# Patient Record
Sex: FEMALE | Race: WHITE | NOT HISPANIC OR LATINO | Employment: FULL TIME | URBAN - NONMETROPOLITAN AREA
[De-identification: names, ages, dates, MRNs, and addresses within clinical notes are randomized per-mention and may not be internally consistent; named-entity substitution may affect disease eponyms.]

---

## 2020-04-06 ENCOUNTER — HISTORICAL (OUTPATIENT)
Dept: ADMINISTRATIVE | Facility: HOSPITAL | Age: 35
End: 2020-04-06

## 2022-06-09 ENCOUNTER — OFFICE VISIT (OUTPATIENT)
Dept: FAMILY MEDICINE | Facility: CLINIC | Age: 37
End: 2022-06-09
Payer: MEDICAID

## 2022-06-09 VITALS
HEART RATE: 84 BPM | TEMPERATURE: 98 F | SYSTOLIC BLOOD PRESSURE: 136 MMHG | BODY MASS INDEX: 31.45 KG/M2 | WEIGHT: 177.5 LBS | HEIGHT: 63 IN | DIASTOLIC BLOOD PRESSURE: 81 MMHG | RESPIRATION RATE: 16 BRPM | OXYGEN SATURATION: 99 %

## 2022-06-09 DIAGNOSIS — F33.1 MODERATE EPISODE OF RECURRENT MAJOR DEPRESSIVE DISORDER: ICD-10-CM

## 2022-06-09 DIAGNOSIS — Z76.89 ESTABLISHING CARE WITH NEW DOCTOR, ENCOUNTER FOR: Primary | ICD-10-CM

## 2022-06-09 PROCEDURE — 3079F PR MOST RECENT DIASTOLIC BLOOD PRESSURE 80-89 MM HG: ICD-10-PCS | Mod: CPTII,,, | Performed by: FAMILY MEDICINE

## 2022-06-09 PROCEDURE — 3075F SYST BP GE 130 - 139MM HG: CPT | Mod: CPTII,,, | Performed by: FAMILY MEDICINE

## 2022-06-09 PROCEDURE — 99203 PR OFFICE/OUTPT VISIT, NEW, LEVL III, 30-44 MIN: ICD-10-PCS | Mod: ,,, | Performed by: FAMILY MEDICINE

## 2022-06-09 PROCEDURE — 1159F MED LIST DOCD IN RCRD: CPT | Mod: CPTII,,, | Performed by: FAMILY MEDICINE

## 2022-06-09 PROCEDURE — 99203 OFFICE O/P NEW LOW 30 MIN: CPT | Mod: ,,, | Performed by: FAMILY MEDICINE

## 2022-06-09 PROCEDURE — 1159F PR MEDICATION LIST DOCUMENTED IN MEDICAL RECORD: ICD-10-PCS | Mod: CPTII,,, | Performed by: FAMILY MEDICINE

## 2022-06-09 PROCEDURE — 3008F PR BODY MASS INDEX (BMI) DOCUMENTED: ICD-10-PCS | Mod: CPTII,,, | Performed by: FAMILY MEDICINE

## 2022-06-09 PROCEDURE — 3008F BODY MASS INDEX DOCD: CPT | Mod: CPTII,,, | Performed by: FAMILY MEDICINE

## 2022-06-09 PROCEDURE — 1160F RVW MEDS BY RX/DR IN RCRD: CPT | Mod: CPTII,,, | Performed by: FAMILY MEDICINE

## 2022-06-09 PROCEDURE — 1160F PR REVIEW ALL MEDS BY PRESCRIBER/CLIN PHARMACIST DOCUMENTED: ICD-10-PCS | Mod: CPTII,,, | Performed by: FAMILY MEDICINE

## 2022-06-09 PROCEDURE — 3079F DIAST BP 80-89 MM HG: CPT | Mod: CPTII,,, | Performed by: FAMILY MEDICINE

## 2022-06-09 PROCEDURE — 3075F PR MOST RECENT SYSTOLIC BLOOD PRESS GE 130-139MM HG: ICD-10-PCS | Mod: CPTII,,, | Performed by: FAMILY MEDICINE

## 2022-06-09 NOTE — PROGRESS NOTES
Carmen Laurent  06/09/2022  [id number]    Mona De Jesus MD  Patient Care Team:  Mona Martínez MD as PCP - General (Family Medicine)      Chief Complaint:  Chief Complaint   Patient presents with    [facility]    Anemia       History of Present Illness:  HPI    36 y.o. female who presents today to [facility]. She c/o chronic, recurrent depression since childhood. She has tried and failed prozac and zoloft. States both of those medications made her feel terrible. She adamantly declines medication treatment at this time. She does feel like she wishes she was not alive at times, but denies a plan or any past attempts. Patient is dealing with many life stressors at this time and does agree to therapy. PHQ-9 score is 12.     Review of Systems  General: denies f/c, weight loss, night sweats, decreased appetite  Eye: denies blurred vision, changes in vision  Respiratory: denies sob, wheezing, cough  Cardiovascular: denies chest pain, palpitations, edema  Gastrointestinal: denies abdominal pain, n/v, constipation, diarrhea  Integumentary: denies rashes, pruritis        Health Maintenance  Health Maintenance Topics with due status: Not Due       Topic Last Completion Date    Influenza Vaccine Not Due     Health Maintenance Due   Topic Date Due    Hepatitis C Screening  Never done    Cervical Cancer Screening  Never done    Lipid Panel  Never done    COVID-19 Vaccine (1) Never done    Pneumococcal Vaccines (Age 0-64) (1 - PCV) Never done    HIV Screening  Never done    TETANUS VACCINE  09/06/2017       Exam:  Vitals:    06/09/22 1357   BP: 136/81   Pulse: 84   Resp: 16   Temp: 97.7 °F (36.5 °C)     Weight: 80.5 kg (177 lb 7.5 oz)   Body mass index is 31.84 kg/m².      Physical Exam  Constitutional: NAD, alert, pleasant  Respiratory: CTAB, no wheezes, rales or rhonchi. No accessory muscle use  Eyes: EOMI  Cardiovascular: RRR, No m/r/g. No JVD. No LE edema  Gastrointestinal: BS+, nontender,  nondistended  Integumentary: warm, dry, intact  Psych: AA&Ox3, tearful during most of the visit            1. Establishing care with new doctor, encounter for    2. Moderate episode of recurrent major depressive disorder  -     Ambulatory referral/consult to Psychology     patient adamantly declines medication. I did  her on starting one. She is not a threat to herself and agrees to therapy so this is a good place to start. She has a good relationship with her mom who seems very supportive.go to ER for worsening depression or SI/HI. Call 911 or go to ED if you have SI/HI.         Follow up: Follow up in about 1 week (around 6/16/2022) for wellness with papsmear.      Care Plan/Goals: Reviewed   Goals    None

## 2022-06-14 DIAGNOSIS — Z00.00 WELLNESS EXAMINATION: Primary | ICD-10-CM

## 2022-06-20 ENCOUNTER — LAB VISIT (OUTPATIENT)
Dept: LAB | Facility: HOSPITAL | Age: 37
End: 2022-06-20
Attending: FAMILY MEDICINE
Payer: MEDICAID

## 2022-06-20 DIAGNOSIS — Z00.00 WELLNESS EXAMINATION: ICD-10-CM

## 2022-06-20 LAB
ANION GAP SERPL CALC-SCNC: 8 MEQ/L
BASOPHILS # BLD AUTO: 0.02 X10(3)/MCL (ref 0–0.2)
BASOPHILS NFR BLD AUTO: 0.3 %
BUN SERPL-MCNC: 15 MG/DL (ref 7–18.7)
CALCIUM SERPL-MCNC: 9 MG/DL (ref 8.4–10.2)
CHLORIDE SERPL-SCNC: 103 MMOL/L (ref 98–107)
CHOLEST SERPL-MCNC: 159 MG/DL
CHOLEST/HDLC SERPL: 3 {RATIO} (ref 0–5)
CO2 SERPL-SCNC: 29 MMOL/L (ref 22–29)
CREAT SERPL-MCNC: 0.91 MG/DL (ref 0.55–1.02)
CREAT/UREA NIT SERPL: 16
EOSINOPHIL # BLD AUTO: 0.09 X10(3)/MCL (ref 0–0.9)
EOSINOPHIL NFR BLD AUTO: 1.3 %
ERYTHROCYTE [DISTWIDTH] IN BLOOD BY AUTOMATED COUNT: 13 % (ref 11.5–17)
GLUCOSE SERPL-MCNC: 87 MG/DL (ref 74–100)
HCT VFR BLD AUTO: 37.2 % (ref 37–47)
HDLC SERPL-MCNC: 63 MG/DL (ref 35–60)
HGB BLD-MCNC: 12.1 GM/DL (ref 12–16)
IMM GRANULOCYTES # BLD AUTO: 0.02 X10(3)/MCL (ref 0–0.02)
IMM GRANULOCYTES NFR BLD AUTO: 0.3 % (ref 0–0.43)
LDLC SERPL CALC-MCNC: 88 MG/DL (ref 50–140)
LYMPHOCYTES # BLD AUTO: 1.7 X10(3)/MCL (ref 0.6–4.6)
LYMPHOCYTES NFR BLD AUTO: 25.2 %
MCH RBC QN AUTO: 29.2 PG (ref 27–31)
MCHC RBC AUTO-ENTMCNC: 32.5 MG/DL (ref 33–36)
MCV RBC AUTO: 89.6 FL (ref 80–94)
MONOCYTES # BLD AUTO: 0.28 X10(3)/MCL (ref 0.1–1.3)
MONOCYTES NFR BLD AUTO: 4.2 %
NEUTROPHILS # BLD AUTO: 4.6 X10(3)/MCL (ref 2.1–9.2)
NEUTROPHILS NFR BLD AUTO: 68.7 %
NRBC BLD AUTO-RTO: 0 %
PLATELET # BLD AUTO: 410 X10(3)/MCL (ref 130–400)
PMV BLD AUTO: 9.3 FL (ref 9.4–12.4)
POTASSIUM SERPL-SCNC: 3.8 MMOL/L (ref 3.5–5.1)
RBC # BLD AUTO: 4.15 X10(6)/MCL (ref 4.2–5.4)
SODIUM SERPL-SCNC: 140 MMOL/L (ref 136–145)
TRIGL SERPL-MCNC: 40 MG/DL (ref 37–140)
VLDLC SERPL CALC-MCNC: 8 MG/DL
WBC # SPEC AUTO: 6.7 X10(3)/MCL (ref 4.5–11.5)

## 2022-06-20 PROCEDURE — 36415 COLL VENOUS BLD VENIPUNCTURE: CPT

## 2022-06-20 PROCEDURE — 80048 BASIC METABOLIC PNL TOTAL CA: CPT

## 2022-06-20 PROCEDURE — 83718 ASSAY OF LIPOPROTEIN: CPT

## 2022-06-20 PROCEDURE — 85025 COMPLETE CBC W/AUTO DIFF WBC: CPT

## 2022-06-21 ENCOUNTER — OFFICE VISIT (OUTPATIENT)
Dept: FAMILY MEDICINE | Facility: CLINIC | Age: 37
End: 2022-06-21
Payer: MEDICAID

## 2022-06-21 ENCOUNTER — TELEPHONE (OUTPATIENT)
Dept: FAMILY MEDICINE | Facility: CLINIC | Age: 37
End: 2022-06-21

## 2022-06-21 VITALS
RESPIRATION RATE: 16 BRPM | SYSTOLIC BLOOD PRESSURE: 138 MMHG | BODY MASS INDEX: 31.48 KG/M2 | WEIGHT: 177.69 LBS | TEMPERATURE: 98 F | OXYGEN SATURATION: 99 % | HEIGHT: 63 IN | DIASTOLIC BLOOD PRESSURE: 88 MMHG | HEART RATE: 92 BPM

## 2022-06-21 DIAGNOSIS — Z00.00 WELLNESS EXAMINATION: Primary | ICD-10-CM

## 2022-06-21 DIAGNOSIS — L02.92 BOIL: ICD-10-CM

## 2022-06-21 DIAGNOSIS — F17.210 CIGARETTE NICOTINE DEPENDENCE WITHOUT COMPLICATION: ICD-10-CM

## 2022-06-21 DIAGNOSIS — Z12.4 CERVICAL CANCER SCREENING: ICD-10-CM

## 2022-06-21 PROCEDURE — 99395 PR PREVENTIVE VISIT,EST,18-39: ICD-10-PCS | Mod: ,,, | Performed by: FAMILY MEDICINE

## 2022-06-21 PROCEDURE — 3008F BODY MASS INDEX DOCD: CPT | Mod: CPTII,,, | Performed by: FAMILY MEDICINE

## 2022-06-21 PROCEDURE — 3075F SYST BP GE 130 - 139MM HG: CPT | Mod: CPTII,,, | Performed by: FAMILY MEDICINE

## 2022-06-21 PROCEDURE — 3079F DIAST BP 80-89 MM HG: CPT | Mod: CPTII,,, | Performed by: FAMILY MEDICINE

## 2022-06-21 PROCEDURE — 87624 HPV HI-RISK TYP POOLED RSLT: CPT | Performed by: FAMILY MEDICINE

## 2022-06-21 PROCEDURE — 99395 PREV VISIT EST AGE 18-39: CPT | Mod: ,,, | Performed by: FAMILY MEDICINE

## 2022-06-21 PROCEDURE — 3008F PR BODY MASS INDEX (BMI) DOCUMENTED: ICD-10-PCS | Mod: CPTII,,, | Performed by: FAMILY MEDICINE

## 2022-06-21 PROCEDURE — 87591 N.GONORRHOEAE DNA AMP PROB: CPT | Performed by: FAMILY MEDICINE

## 2022-06-21 PROCEDURE — 87491 CHLMYD TRACH DNA AMP PROBE: CPT | Performed by: FAMILY MEDICINE

## 2022-06-21 PROCEDURE — 3079F PR MOST RECENT DIASTOLIC BLOOD PRESSURE 80-89 MM HG: ICD-10-PCS | Mod: CPTII,,, | Performed by: FAMILY MEDICINE

## 2022-06-21 PROCEDURE — 3075F PR MOST RECENT SYSTOLIC BLOOD PRESS GE 130-139MM HG: ICD-10-PCS | Mod: CPTII,,, | Performed by: FAMILY MEDICINE

## 2022-06-21 PROCEDURE — 87661 TRICHOMONAS VAGINALIS AMPLIF: CPT | Performed by: FAMILY MEDICINE

## 2022-06-21 RX ORDER — MUPIROCIN 20 MG/G
OINTMENT TOPICAL 3 TIMES DAILY
Qty: 22 G | Refills: 0 | Status: SHIPPED | OUTPATIENT
Start: 2022-06-21 | End: 2022-07-01

## 2022-06-21 NOTE — PROGRESS NOTES
Carmen Laurent  06/21/2022  [id number]    Mona De Jesus MD  Patient Care Team:  Mona Martínez MD as PCP - General (Family Medicine)      Chief Complaint:  Chief Complaint   Patient presents with    Annual Exam    Gynecologic Exam       History of Present Illness:  HPI    36 y.o. female who presents today for a wellness exam with a papsmear. Labs reviewed by me and were discussed with patient. All questions regarding lab results were answered.   She is a vaper and also smokes marijuana. Smoking cessation was offered and patient declined. She denies any significant FH. Does not exercise and eats a regular diet.     As a separate e/m visit, she c/o lesion to left side of abdomen that appeared as a boil last week and has since drained. It is improving and no longer painful. Patient has never had a boil before. She has been applying neosporin.     Review of Systems  General: denies f/c, weight loss, night sweats, decreased appetite  Eye: denies blurred vision, changes in vision  Respiratory: denies sob, wheezing, cough  Cardiovascular: denies chest pain, palpitations, edema  Gastrointestinal: denies abdominal pain, n/v, constipation, diarrhea           Health Maintenance  Health Maintenance Topics with due status: Not Due       Topic Last Completion Date    Influenza Vaccine Not Due     Health Maintenance Due   Topic Date Due    Hepatitis C Screening  Never done    Cervical Cancer Screening  Never done    COVID-19 Vaccine (1) Never done    Pneumococcal Vaccines (Age 0-64) (1 - PCV) Never done    HIV Screening  Never done    TETANUS VACCINE  09/06/2017       Exam:  Vitals:    06/21/22 1007   BP: 138/88   Pulse: 92   Resp: 16   Temp: 97.5 °F (36.4 °C)     Weight: 80.6 kg (177 lb 11.1 oz)   Body mass index is 31.88 kg/m².      Physical Exam  Chaperone present    Constitutional: NAD, alert, pleasant  Respiratory: CTAB, no wheezes, rales or rhonchi. No accessory muscle use  Eyes: EOMI  Cardiovascular: RRR, No  m/r/g  Pelvic exam: no external lesions or masses, no vaginal lesions or discharge. no cervical lesion or tenderness. NO cervical discharge. No uteromegaly, ovarian masses or uterine tenderness  Gastrointestinal: BS+, nontender, nondistended  Integumentary: warm, dry, intact. There is a lesion to left side of abdomen that appears to be healing well with the scab in the center. No fluctuance, odor or induration. NO cellulitis.   Psych: AA&Ox3        1. Wellness examination    2. Cervical cancer screening  -     Liquid-Based Pap Smear, Screening Screening    3. Cigarette nicotine dependence without complication    4. Boil  -     mupirocin (BACTROBAN) 2 % ointment       Exercise for a total of 150 min per week and eat a healthy diet  Stay up to date with all cancer screening discussed in visit  Immunizations due were discussed during visit  All health maintenance was reviewed with patient. Patient verbalized understanding. All questions were answered.   Smoking cessation encouraged. Patient denies referral to smoking cessation program.   Papsmear performed today.     Keep lesion clean and dry. Will give mupirocin ointment. Rtc if no improvement.   Follow up: Follow up in about 1 year (around 6/21/2023) for wellness.      Care Plan/Goals: Reviewed   Goals    None

## 2022-06-21 NOTE — TELEPHONE ENCOUNTER
I faxed request to old PCP asking for imaging and never got a response, I called a few days later to f/u and see if imaging report was even in their system and I was told they could not give me that information over the phone..... when I asked if we would get some sort of notification if records were unavailable she also said no. So if we don't get a fax, they don't have it...... I'll refax request again though.

## 2022-06-30 LAB
HUMAN PAPILLOMAVIRUS (HPV): NORMAL
PAP RECOMMENDATION EXT: NORMAL
PAP SMEAR: NORMAL

## 2022-07-01 ENCOUNTER — DOCUMENTATION ONLY (OUTPATIENT)
Dept: ADMINISTRATIVE | Facility: HOSPITAL | Age: 37
End: 2022-07-01
Payer: MEDICAID

## 2022-07-01 ENCOUNTER — TELEPHONE (OUTPATIENT)
Dept: FAMILY MEDICINE | Facility: CLINIC | Age: 37
End: 2022-07-01
Payer: MEDICAID

## 2022-07-01 NOTE — TELEPHONE ENCOUNTER
papsmear normal. Repeat in 5 years per guidelines or sooner if she has multiple partners and becomes high risk.

## 2022-07-11 LAB
INSULIN SERPL-ACNC: NORMAL U[IU]/ML
LAB AP BETHESDA CATEGORY: NORMAL
LAB AP CONTRACEPTIVES: NORMAL
LAB AP DIAGNOISIS OR INDICATION FOR TEST: NORMAL
LAB AP GYN MOLECULAR TESTING: NORMAL
LAB AP LMP DATE: NORMAL
LAB AP OCHS PAP SPECIMEN ADEQUACY: NORMAL
LAB AP OHS PAP INTERPRETATION: NORMAL
LAB AP PAP DISCLAIMER COMMENTS: NORMAL
LAB AP PAP ESTROGEN REPLACEMENT THERAPY: NORMAL
LAB AP PAP PMP: NORMAL
LAB AP PAP PREVIOUS BX: NORMAL
LAB AP PAP PRIOR TREATMENT: NORMAL

## 2022-07-15 LAB
CHLAMYDIA TRACHOMATIS (PRECISION): NEGATIVE
GARDNERELLA VAGINALIS (PRECISION): POSITIVE
HPV16+18+H RISK 12 DNA CVX-IMP: NEGATIVE
NEISSERIA GONORRHOEAE (PRECISION): NEGATIVE
TRICHOMONAS VAGINALIS (PRECISION): NEGATIVE

## 2022-07-19 NOTE — PROGRESS NOTES
Sent this note via portal but she never read, please call with results, thanks       I am covering Dr. De Jesus's inbox while she is out.  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 We do not treat unless you are having symptoms such as vaginal discharge, vaginal itching/irritation. Let me know if you have any symptoms.      Please contact me if you have any additional concerns.     Sincerely,     ARABELLA MAHAJAN  Your results look fine and do not require any change in treatment.

## 2022-12-01 ENCOUNTER — HOSPITAL ENCOUNTER (EMERGENCY)
Facility: HOSPITAL | Age: 37
Discharge: HOME | End: 2022-12-01
Attending: INTERNAL MEDICINE
Payer: MEDICAID

## 2022-12-01 VITALS
SYSTOLIC BLOOD PRESSURE: 164 MMHG | OXYGEN SATURATION: 100 % | BODY MASS INDEX: 32.61 KG/M2 | HEIGHT: 63 IN | TEMPERATURE: 98 F | DIASTOLIC BLOOD PRESSURE: 90 MMHG | RESPIRATION RATE: 18 BRPM | WEIGHT: 184.06 LBS | HEART RATE: 82 BPM

## 2022-12-01 DIAGNOSIS — S90.32XA CONTUSION OF LEFT FOOT, INITIAL ENCOUNTER: ICD-10-CM

## 2022-12-01 DIAGNOSIS — S93.602A FOOT SPRAIN, LEFT, INITIAL ENCOUNTER: Primary | ICD-10-CM

## 2022-12-01 DIAGNOSIS — R52 PAIN: ICD-10-CM

## 2022-12-01 PROCEDURE — 99283 EMERGENCY DEPT VISIT LOW MDM: CPT

## 2022-12-01 NOTE — ED PROVIDER NOTES
Encounter Date: 12/1/2022       History     Chief Complaint   Patient presents with    Foot Injury     Pt c/o left foot pain after slipping into a ditch 2 days ago.     37-year-old white female reports that she stepped into a ditch twisting her foot and did not note any specific pain at that moment however she went to work yesterday and by the end of the day her foot was really swollen and hurting over the 4th and 5th digits of her left foot she placed a tight sock on and states it feels better but she does not want to go to work tomorrow so she came to the emergency department in the middle the night to see what was wrong with the foot and maybe get a work excuse    Review of patient's allergies indicates:  No Known Allergies  Past Medical History:   Diagnosis Date    Anemia, unspecified     Hip pain, left     Kidney lesion 2018    Lumbar radiculopathy     Pancreatitis     PCOS (polycystic ovarian syndrome)     PID (pelvic inflammatory disease)     Sciatica of left side     Sciatica without back pain, left     Swelling of hip joint, left      Past Surgical History:   Procedure Laterality Date    CHOLECYSTECTOMY  2019    LUMBAR LAMINECTOMY WITH DISCECTOMY  2020    Dr J Luis Rothman     Family History   Problem Relation Age of Onset    Diabetes Mellitus Maternal Grandmother     Cancer Maternal Uncle         metastatic    Cancer Maternal Uncle         metasastic     Social History     Tobacco Use    Smoking status: Every Day    Smokeless tobacco: Never    Tobacco comments:     vapes daily   Substance Use Topics    Alcohol use: Not Currently    Drug use: Yes     Frequency: 5.0 times per week     Types: Marijuana     Review of Systems   Constitutional: Negative.  Negative for activity change, appetite change, chills, diaphoresis, fatigue, fever and unexpected weight change.   HENT: Negative.  Negative for congestion, dental problem, drooling, ear discharge, ear pain, facial swelling, hearing loss, mouth sores,  nosebleeds, postnasal drip, rhinorrhea, sinus pressure, sinus pain, sneezing, sore throat, tinnitus, trouble swallowing and voice change.    Eyes: Negative.  Negative for photophobia, pain, discharge, redness, itching and visual disturbance.   Respiratory: Negative.  Negative for apnea, cough, choking, chest tightness, shortness of breath, wheezing and stridor.    Cardiovascular: Negative.  Negative for chest pain, palpitations and leg swelling.   Gastrointestinal: Negative.  Negative for abdominal distention, abdominal pain, anal bleeding, blood in stool, constipation, diarrhea, nausea, rectal pain and vomiting.   Endocrine: Negative.  Negative for cold intolerance, heat intolerance, polydipsia, polyphagia and polyuria.   Genitourinary: Negative.  Negative for decreased urine volume, difficulty urinating, dyspareunia, dysuria, enuresis, flank pain, frequency, genital sores, hematuria, menstrual problem, pelvic pain, urgency, vaginal bleeding, vaginal discharge and vaginal pain.   Musculoskeletal:  Positive for gait problem and joint swelling. Negative for arthralgias, back pain, myalgias, neck pain and neck stiffness.   Skin: Negative.  Negative for color change, pallor, rash and wound.   Allergic/Immunologic: Negative.  Negative for environmental allergies, food allergies and immunocompromised state.   Neurological:  Negative for dizziness, tremors, seizures, syncope, facial asymmetry, speech difficulty, weakness, light-headedness, numbness and headaches.   Hematological: Negative.  Negative for adenopathy. Does not bruise/bleed easily.   Psychiatric/Behavioral: Negative.  Negative for agitation, behavioral problems, confusion, decreased concentration, dysphoric mood, hallucinations, self-injury, sleep disturbance and suicidal ideas. The patient is not nervous/anxious and is not hyperactive.    All other systems reviewed and are negative.    Physical Exam     Initial Vitals [12/01/22 0221]   BP Pulse Resp Temp  SpO2   (!) 170/98 87 20 97.5 °F (36.4 °C) 100 %      MAP       --         Physical Exam    Nursing note and vitals reviewed.  Constitutional: She appears well-developed and well-nourished. She is not diaphoretic. No distress.   HENT:   Head: Normocephalic and atraumatic.   Mouth/Throat: Oropharynx is clear and moist. No oropharyngeal exudate.   Eyes: Conjunctivae and EOM are normal. Pupils are equal, round, and reactive to light. No scleral icterus.   Neck: Neck supple. No JVD present.   Normal range of motion.  Cardiovascular:  Normal rate, regular rhythm, normal heart sounds and intact distal pulses.     Exam reveals no gallop and no friction rub.       No murmur heard.  Pulmonary/Chest: Breath sounds normal. No respiratory distress. She has no wheezes. She exhibits no tenderness.   Abdominal: Abdomen is soft. Bowel sounds are normal. She exhibits no distension. There is no abdominal tenderness. There is no rebound.   Musculoskeletal:         General: Normal range of motion.      Cervical back: Normal range of motion and neck supple.      Comments: Minimal edema noted to the dorsum of the left foot over the 4th and 5th metatarsals.  Tender to touch.  She has normal range of motion     Neurological: She is alert and oriented to person, place, and time. She has normal strength.   Skin: Skin is warm and dry. Capillary refill takes less than 2 seconds.   Psychiatric: She has a normal mood and affect. Her behavior is normal. Judgment and thought content normal.       ED Course   Procedures  Labs Reviewed - No data to display       Imaging Results              X-Ray Foot Complete Left (Preliminary result)  Result time 12/01/22 02:57:05      ED Interpretation by Amol Sherman MD (12/01/22 02:57:05, [facility] Emergency Dept, Emergency Medicine)    No acute fractures or dislocations noted                                     Medications - No data to display              ED Course as of 12/01/22 7560    Thu Dec 01, 2022   0301 Offered injection patient does not want  [PL]      ED Course User Index  [PL] Amol Sherman MD                 Clinical Impression:   Final diagnoses:  [R52] Pain  [S93.602A] Foot sprain, left, initial encounter (Primary)  [S90.32XA] Contusion of left foot, initial encounter        ED Disposition Condition    Discharge Stable          ED Prescriptions    None       Follow-up Information       Follow up With Specialties Details Why Contact Info    Primary care physician  In 3 days            Zoila Abebe is a certified LPN and was present during the entire interaction with this patient      Amol Sherman MD  12/01/22 0301

## 2023-04-24 ENCOUNTER — HOSPITAL ENCOUNTER (EMERGENCY)
Facility: HOSPITAL | Age: 38
Discharge: HOME | End: 2023-04-24
Attending: EMERGENCY MEDICINE
Payer: MEDICAID

## 2023-04-24 VITALS
HEIGHT: 63 IN | TEMPERATURE: 98 F | DIASTOLIC BLOOD PRESSURE: 104 MMHG | BODY MASS INDEX: 31.89 KG/M2 | HEART RATE: 79 BPM | SYSTOLIC BLOOD PRESSURE: 146 MMHG | WEIGHT: 180 LBS | RESPIRATION RATE: 18 BRPM | OXYGEN SATURATION: 98 %

## 2023-04-24 DIAGNOSIS — S05.01XA ABRASION OF RIGHT CORNEA, INITIAL ENCOUNTER: ICD-10-CM

## 2023-04-24 DIAGNOSIS — S05.01XA INJ CONJUNCTIVA AND CORNEAL ABRASION W/O FB, RIGHT EYE, INIT: Primary | ICD-10-CM

## 2023-04-24 PROCEDURE — 25000003 PHARM REV CODE 250: Performed by: EMERGENCY MEDICINE

## 2023-04-24 PROCEDURE — 99284 EMERGENCY DEPT VISIT MOD MDM: CPT

## 2023-04-24 RX ORDER — ERYTHROMYCIN 5 MG/G
OINTMENT OPHTHALMIC
Qty: 3.5 G | Refills: 0 | Status: SHIPPED | OUTPATIENT
Start: 2023-04-24 | End: 2023-06-27

## 2023-04-24 RX ORDER — AMLODIPINE BESYLATE 5 MG/1
5 TABLET ORAL DAILY
COMMUNITY
Start: 2023-01-09 | End: 2023-09-18 | Stop reason: SDUPTHER

## 2023-04-24 RX ORDER — HYDROCODONE BITARTRATE AND ACETAMINOPHEN 5; 325 MG/1; MG/1
1 TABLET ORAL EVERY 4 HOURS PRN
Qty: 15 TABLET | Refills: 0 | Status: SHIPPED | OUTPATIENT
Start: 2023-04-24 | End: 2023-06-27

## 2023-04-24 RX ORDER — TETRACAINE HYDROCHLORIDE 5 MG/ML
2 SOLUTION OPHTHALMIC
Status: COMPLETED | OUTPATIENT
Start: 2023-04-24 | End: 2023-04-24

## 2023-04-24 RX ADMIN — TETRACAINE HYDROCHLORIDE 2 DROP: 5 SOLUTION OPHTHALMIC at 02:04

## 2023-04-24 RX ADMIN — FLUORESCEIN SODIUM 1 EACH: 1 STRIP OPHTHALMIC at 02:04

## 2023-04-24 NOTE — ED PROVIDER NOTES
Encounter Date: 4/24/2023       History     Chief Complaint   Patient presents with    Eye Pain     Right eye pain after scratching eye with her fingernail on Friday. Increase in pain and swelling since.       Patient is a 37-year-old female presenting with complain of irritation to the right eye for the last 2-3 days.  Two 3 days ago, patient states she accidentally scratched her right eye.  No known foreign body.  No other complaints.  No headache.    Review of patient's allergies indicates:  No Known Allergies  Past Medical History:   Diagnosis Date    Anemia, unspecified     Hip pain, left     Kidney lesion 2018    Lumbar radiculopathy     Pancreatitis     PCOS (polycystic ovarian syndrome)     PID (pelvic inflammatory disease)     Sciatica of left side     Sciatica without back pain, left     Swelling of hip joint, left      Past Surgical History:   Procedure Laterality Date    CHOLECYSTECTOMY  2019    LUMBAR LAMINECTOMY WITH DISCECTOMY  2020    Dr J Luis Rothman     Family History   Problem Relation Age of Onset    Diabetes Mellitus Maternal Grandmother     Cancer Maternal Uncle         metastatic    Cancer Maternal Uncle         metasastic     Social History     Tobacco Use    Smoking status: Every Day    Smokeless tobacco: Never    Tobacco comments:     vapes daily   Substance Use Topics    Alcohol use: Not Currently    Drug use: Yes     Frequency: 5.0 times per week     Types: Marijuana     Review of Systems   Constitutional: Negative.    Eyes:  Positive for pain and redness. Negative for discharge and visual disturbance.   Respiratory: Negative.     Cardiovascular: Negative.    Gastrointestinal: Negative.    Musculoskeletal: Negative.    Neurological: Negative.    Psychiatric/Behavioral: Negative.       Physical Exam     Initial Vitals [04/24/23 1355]   BP Pulse Resp Temp SpO2   (!) 166/112 79 18 97.9 °F (36.6 °C) 98 %      MAP       --         Physical Exam    Nursing note and vitals  reviewed.  Constitutional: She appears well-developed and well-nourished.   HENT:   Head: Normocephalic.   Eyes:   Patient has mild conjunctival injection of the right eye.  No foreign body seen.  No purulent discharge.  Tetracaine ophthalmic drops were instilled and fluorescein was used to stain.  Patient has a moderate-size corneal abrasion at 12:00 p.m. to the pupil.  Pupil is round and reactive to light.  Patient has mild conjunctival injection of the right eye.   Cardiovascular:  Normal rate, regular rhythm and normal heart sounds.           Pulmonary/Chest: Breath sounds normal.   Musculoskeletal:         General: Normal range of motion.     Neurological: She is alert and oriented to person, place, and time. She has normal strength.   Psychiatric: She has a normal mood and affect. Thought content normal.       ED Course   Procedures  Labs Reviewed - No data to display       Imaging Results    None          Medications   TETRAcaine HCl (PF) 0.5 % Drop 2 drop (2 drops Right Eye Given 4/24/23 1400)   fluorescein ophthalmic strip 1 each (1 each Right Eye Given 4/24/23 1404)     Medical Decision Making:   Initial Assessment:    As per HPI  Differential Diagnosis:    Foreign body, corneal abrasion  ED Management:   Tetracaine ophthalmic drops were instilled into the right eye.  Fluorescein strip was then used to stain the eye.  Patient has a corneal abrasion at 12:00 p.m. to the pupil of the right eye.  No foreign body seen.  No purulent discharge.  Positive mild conjunctival injection.  Patient will be discharged on pain medication and erythromycin ophthalmic.                        Clinical Impression:   Final diagnoses:  [S05.01XA] Inj conjunctiva and corneal abrasion w/o fb, right eye, init (Primary)  [S05.01XA] Abrasion of right cornea, initial encounter        ED Disposition Condition    Discharge Stable          ED Prescriptions       Medication Sig Dispense Start Date End Date Auth. Provider    erythromycin  (ROMYCIN) ophthalmic ointment Place a 1/2 inch ribbon of ointment into the R lower eyelid 3.5 g 4/24/2023 -- Delfin Lee MD    HYDROcodone-acetaminophen (NORCO) 5-325 mg per tablet Take 1 tablet by mouth every 4 (four) hours as needed for Pain. 15 tablet 4/24/2023 -- Delfin Lee MD          Follow-up Information       Follow up With Specialties Details Why Contact Info    [facility]  If symptoms worsen [address] [place] [phone] [phone]             Delfin Lee MD  04/24/23 4691

## 2023-06-27 ENCOUNTER — OFFICE VISIT (OUTPATIENT)
Dept: FAMILY MEDICINE | Facility: CLINIC | Age: 38
End: 2023-06-27
Payer: MEDICAID

## 2023-06-27 VITALS
HEIGHT: 63 IN | RESPIRATION RATE: 18 BRPM | TEMPERATURE: 98 F | SYSTOLIC BLOOD PRESSURE: 134 MMHG | BODY MASS INDEX: 30.94 KG/M2 | WEIGHT: 174.63 LBS | HEART RATE: 87 BPM | OXYGEN SATURATION: 98 % | DIASTOLIC BLOOD PRESSURE: 82 MMHG

## 2023-06-27 DIAGNOSIS — I10 PRIMARY HYPERTENSION: ICD-10-CM

## 2023-06-27 DIAGNOSIS — Z11.4 ENCOUNTER FOR SCREENING FOR HIV: ICD-10-CM

## 2023-06-27 DIAGNOSIS — Z00.00 ROUTINE GENERAL MEDICAL EXAMINATION AT A HEALTH CARE FACILITY: Primary | ICD-10-CM

## 2023-06-27 DIAGNOSIS — Z13.1 DIABETES MELLITUS SCREENING: ICD-10-CM

## 2023-06-27 PROCEDURE — 3079F PR MOST RECENT DIASTOLIC BLOOD PRESSURE 80-89 MM HG: ICD-10-PCS | Mod: CPTII,,, | Performed by: FAMILY MEDICINE

## 2023-06-27 PROCEDURE — 3079F DIAST BP 80-89 MM HG: CPT | Mod: CPTII,,, | Performed by: FAMILY MEDICINE

## 2023-06-27 PROCEDURE — 1160F RVW MEDS BY RX/DR IN RCRD: CPT | Mod: CPTII,,, | Performed by: FAMILY MEDICINE

## 2023-06-27 PROCEDURE — 3075F SYST BP GE 130 - 139MM HG: CPT | Mod: CPTII,,, | Performed by: FAMILY MEDICINE

## 2023-06-27 PROCEDURE — 1160F PR REVIEW ALL MEDS BY PRESCRIBER/CLIN PHARMACIST DOCUMENTED: ICD-10-PCS | Mod: CPTII,,, | Performed by: FAMILY MEDICINE

## 2023-06-27 PROCEDURE — 99395 PR PREVENTIVE VISIT,EST,18-39: ICD-10-PCS | Mod: ,,, | Performed by: FAMILY MEDICINE

## 2023-06-27 PROCEDURE — 99395 PREV VISIT EST AGE 18-39: CPT | Mod: ,,, | Performed by: FAMILY MEDICINE

## 2023-06-27 PROCEDURE — 1159F MED LIST DOCD IN RCRD: CPT | Mod: CPTII,,, | Performed by: FAMILY MEDICINE

## 2023-06-27 PROCEDURE — 3075F PR MOST RECENT SYSTOLIC BLOOD PRESS GE 130-139MM HG: ICD-10-PCS | Mod: CPTII,,, | Performed by: FAMILY MEDICINE

## 2023-06-27 PROCEDURE — 3008F BODY MASS INDEX DOCD: CPT | Mod: CPTII,,, | Performed by: FAMILY MEDICINE

## 2023-06-27 PROCEDURE — 1159F PR MEDICATION LIST DOCUMENTED IN MEDICAL RECORD: ICD-10-PCS | Mod: CPTII,,, | Performed by: FAMILY MEDICINE

## 2023-06-27 PROCEDURE — 3008F PR BODY MASS INDEX (BMI) DOCUMENTED: ICD-10-PCS | Mod: CPTII,,, | Performed by: FAMILY MEDICINE

## 2023-06-27 NOTE — PROGRESS NOTES
Patient ID: [id number]     Chief Complaint: Annual Exam        HPI:     Carmen Laurent is a 37 y.o. female here today for an annual wellness visit. She has not had bloodwork completed prior to visit as ordered but will do so in the near future. Overall she feels well. Was started on amlodipine for HTN by urgent care. Recent viral gastroenteritis.         ----------------------------  Anemia, unspecified  Hip pain, left  Kidney lesion  Lumbar radiculopathy  Pancreatitis  PCOS (polycystic ovarian syndrome)  PID (pelvic inflammatory disease)  Sciatica of left side  Sciatica without back pain, left  Swelling of hip joint, left     Past Surgical History:   Procedure Laterality Date    CHOLECYSTECTOMY  2019    LUMBAR LAMINECTOMY WITH DISCECTOMY  2020    Dr J Luis Rothman       Review of patient's allergies indicates:  No Known Allergies    Outpatient Medications Marked as Taking for the 6/27/23 encounter (Office Visit) with Dennis Bell, DO   Medication Sig Dispense Refill    amLODIPine (NORVASC) 5 MG tablet Take 5 mg by mouth once daily.      gabapentin (NEURONTIN) 800 MG tablet Take 800 mg by mouth 3 (three) times daily.         Social History     Socioeconomic History    Marital status: Single    Number of children: 0   Tobacco Use    Smoking status: Every Day    Smokeless tobacco: Never    Tobacco comments:     vapes daily   Substance and Sexual Activity    Alcohol use: Not Currently    Drug use: Yes     Frequency: 5.0 times per week     Types: Marijuana    Sexual activity: Yes     Social Determinants of Health     Financial Resource Strain: Low Risk     Difficulty of Paying Living Expenses: Not hard at all   Food Insecurity: No Food Insecurity    Worried About Running Out of Food in the Last Year: Never true    Ran Out of Food in the Last Year: Never true   Transportation Needs: No Transportation Needs    Lack of Transportation (Medical): No    Lack of Transportation (Non-Medical): No   Physical Activity:  "Sufficiently Active    Days of Exercise per Week: 5 days    Minutes of Exercise per Session: 30 min   Stress: No Stress Concern Present    Feeling of Stress : Only a little   Social Connections: Moderately Integrated    Frequency of Communication with Friends and Family: More than three times a week    Frequency of Social Gatherings with Friends and Family: Three times a week    Attends Judaism Services: 1 to 4 times per year    Active Member of Clubs or Organizations: Yes    Attends Club or Organization Meetings: 1 to 4 times per year    Marital Status: Never    Housing Stability: Low Risk     Unable to Pay for Housing in the Last Year: No    Number of Places Lived in the Last Year: 1    Unstable Housing in the Last Year: No        Family History   Problem Relation Age of Onset    Diabetes Mellitus Maternal Grandmother     Cancer Maternal Uncle         metastatic    Cancer Maternal Uncle         metasastic        Patient Care Team:  Dennis Bell DO as PCP - General (Family Medicine)       Subjective:     Review of Systems   Constitutional:  Negative for chills and fever.   Respiratory:  Negative for shortness of breath.    Cardiovascular:  Negative for chest pain.   Gastrointestinal:  Negative for constipation and diarrhea.   Neurological:  Negative for headaches.   Psychiatric/Behavioral:  The patient does not have insomnia.      See HPI for details      All Other ROS: Negative except as stated in HPI.       Objective:     /82   Pulse 87   Temp 97.8 °F (36.6 °C) (Tympanic)   Resp 18   Ht 5' 2.99" (1.6 m)   Wt 79.2 kg (174 lb 9.6 oz)   SpO2 98%   BMI 30.94 kg/m²     Physical Exam  Vitals reviewed.   Constitutional:       General: She is not in acute distress.     Appearance: Normal appearance.   Cardiovascular:      Rate and Rhythm: Normal rate and regular rhythm.      Heart sounds: No murmur heard.    No friction rub. No gallop.   Pulmonary:      Effort: No respiratory distress.      " Breath sounds: No wheezing, rhonchi or rales.   Musculoskeletal:         General: No swelling, tenderness or deformity.      Right lower leg: No edema.      Left lower leg: No edema.   Skin:     General: Skin is warm and dry.      Findings: No lesion or rash.   Neurological:      General: No focal deficit present.      Mental Status: She is alert.   Psychiatric:         Mood and Affect: Mood normal.           Assessment:       ICD-10-CM ICD-9-CM   1. Routine general medical examination at a health care facility  Z00.00 V70.0   2. Primary hypertension  I10 401.9   3. Diabetes mellitus screening  Z13.1 V77.1   4. Encounter for screening for HIV  Z11.4 V73.89        Plan:         Health Maintenance Topics with due status: Not Due       Topic Last Completion Date    Cervical Cancer Screening 06/23/2022    Influenza Vaccine Not Due        Cervical Cancer Screening - Last pap 6/23/22. Next due 6/23/27.     Eye Exam - Recommend annually.    Dental Exam - Recommend biannual exams.     Vaccinations -   Immunization History   Administered Date(s) Administered    DTP 1983, 02/21/1984, 05/08/1984, 10/30/1984, 04/12/1988    Hepatitis A / Hepatitis B 09/06/2007    MMR 10/30/1984    OPV 1983, 02/21/1984, 10/30/1984, 04/12/1988    Td (Adult), Unspecified Formulation 09/06/2007            1. Routine general medical examination at a health care facility  - CBC Auto Differential; Future  - Comprehensive Metabolic Panel; Future  - Lipid Panel; Future  - TSH; Future  - T4, Free; Future  - Hepatitis C Antibody; Future    2. Primary hypertension  - TSH; Future  - T4, Free; Future    3. Diabetes mellitus screening  - Hemoglobin A1C; Future    4. Encounter for screening for HIV  - HIV 1/2 Ag/Ab (4th Gen); Future        Medication List with Changes/Refills   Current Medications    AMLODIPINE (NORVASC) 5 MG TABLET    Take 5 mg by mouth once daily.       Start Date: 1/9/2023  End Date: --    GABAPENTIN (NEURONTIN) 800 MG TABLET     Take 800 mg by mouth 3 (three) times daily.       Start Date: 6/15/2021 End Date: --   Discontinued Medications    ERYTHROMYCIN (ROMYCIN) OPHTHALMIC OINTMENT    Place a 1/2 inch ribbon of ointment into the R lower eyelid       Start Date: 4/24/2023 End Date: 6/27/2023    HYDROCODONE-ACETAMINOPHEN (NORCO) 5-325 MG PER TABLET    Take 1 tablet by mouth every 4 (four) hours as needed for Pain.       Start Date: 4/24/2023 End Date: 6/27/2023          The patient's Health Maintenance was reviewed and the following appears to be due at this time:   Health Maintenance Due   Topic Date Due    Hepatitis C Screening  Never done    COVID-19 Vaccine (1) Never done    Pneumococcal Vaccines (Age 0-64) (1 - PCV) Never done    HIV Screening  Never done    TETANUS VACCINE  09/06/2017    Hemoglobin A1c (Diabetic Prevention Screening)  Never done         Follow up in about 3 months (around 9/27/2023) for Follow up. In addition to their scheduled follow up, the patient has also been instructed to follow up on as needed basis.            n/a

## 2023-09-11 ENCOUNTER — HOSPITAL ENCOUNTER (EMERGENCY)
Facility: HOSPITAL | Age: 38
Discharge: HOME | End: 2023-09-11
Attending: GENERAL ACUTE CARE HOSPITAL
Payer: MEDICAID

## 2023-09-11 VITALS
HEART RATE: 75 BPM | SYSTOLIC BLOOD PRESSURE: 126 MMHG | WEIGHT: 175 LBS | BODY MASS INDEX: 31.01 KG/M2 | RESPIRATION RATE: 20 BRPM | OXYGEN SATURATION: 100 % | DIASTOLIC BLOOD PRESSURE: 87 MMHG | TEMPERATURE: 98 F | HEIGHT: 63 IN

## 2023-09-11 DIAGNOSIS — R79.89 ELEVATED LFTS: ICD-10-CM

## 2023-09-11 DIAGNOSIS — R05.9 COUGH: ICD-10-CM

## 2023-09-11 DIAGNOSIS — J18.9 PNEUMONIA OF RIGHT MIDDLE LOBE DUE TO INFECTIOUS ORGANISM: Primary | ICD-10-CM

## 2023-09-11 LAB
ALBUMIN SERPL-MCNC: 3.2 G/DL (ref 3.5–5)
ALBUMIN/GLOB SERPL: 0.9 RATIO (ref 1.1–2)
ALP SERPL-CCNC: 125 UNIT/L (ref 40–150)
ALT SERPL-CCNC: 109 UNIT/L (ref 0–55)
AST SERPL-CCNC: 100 UNIT/L (ref 5–34)
BASOPHILS # BLD AUTO: 0.01 X10(3)/MCL
BASOPHILS NFR BLD AUTO: 0.1 %
BILIRUB SERPL-MCNC: 0.3 MG/DL
BUN SERPL-MCNC: 6 MG/DL (ref 7–18.7)
CALCIUM SERPL-MCNC: 9 MG/DL (ref 8.4–10.2)
CHLORIDE SERPL-SCNC: 104 MMOL/L (ref 98–107)
CO2 SERPL-SCNC: 25 MMOL/L (ref 22–29)
CREAT SERPL-MCNC: 0.74 MG/DL (ref 0.55–1.02)
EOSINOPHIL # BLD AUTO: 0.06 X10(3)/MCL (ref 0–0.9)
EOSINOPHIL NFR BLD AUTO: 0.8 %
ERYTHROCYTE [DISTWIDTH] IN BLOOD BY AUTOMATED COUNT: 12.8 % (ref 11.5–17)
FLUAV AG UPPER RESP QL IA.RAPID: NOT DETECTED
FLUBV AG UPPER RESP QL IA.RAPID: NOT DETECTED
GFR SERPLBLD CREATININE-BSD FMLA CKD-EPI: >60 MLS/MIN/1.73/M2
GLOBULIN SER-MCNC: 3.6 GM/DL (ref 2.4–3.5)
GLUCOSE SERPL-MCNC: 117 MG/DL (ref 74–100)
HCT VFR BLD AUTO: 38.8 % (ref 37–47)
HGB BLD-MCNC: 12.4 G/DL (ref 12–16)
IMM GRANULOCYTES # BLD AUTO: 0.02 X10(3)/MCL (ref 0–0.04)
IMM GRANULOCYTES NFR BLD AUTO: 0.3 %
LYMPHOCYTES # BLD AUTO: 0.64 X10(3)/MCL (ref 0.6–4.6)
LYMPHOCYTES NFR BLD AUTO: 8.8 %
MCH RBC QN AUTO: 28.4 PG (ref 27–31)
MCHC RBC AUTO-ENTMCNC: 32 G/DL (ref 33–36)
MCV RBC AUTO: 89 FL (ref 80–94)
MONO SCR (OHS): NEGATIVE
MONOCYTES # BLD AUTO: 0.34 X10(3)/MCL (ref 0.1–1.3)
MONOCYTES NFR BLD AUTO: 4.7 %
NEUTROPHILS # BLD AUTO: 6.24 X10(3)/MCL (ref 2.1–9.2)
NEUTROPHILS NFR BLD AUTO: 85.3 %
PLATELET # BLD AUTO: 313 X10(3)/MCL (ref 130–400)
PMV BLD AUTO: 8.9 FL (ref 7.4–10.4)
POTASSIUM SERPL-SCNC: 3.6 MMOL/L (ref 3.5–5.1)
PROT SERPL-MCNC: 6.8 GM/DL (ref 6.4–8.3)
RBC # BLD AUTO: 4.36 X10(6)/MCL (ref 4.2–5.4)
SARS-COV-2 RNA RESP QL NAA+PROBE: NOT DETECTED
SODIUM SERPL-SCNC: 137 MMOL/L (ref 136–145)
STREP A PCR (OHS): NOT DETECTED
WBC # SPEC AUTO: 7.31 X10(3)/MCL (ref 4.5–11.5)

## 2023-09-11 PROCEDURE — 99284 EMERGENCY DEPT VISIT MOD MDM: CPT | Mod: 25

## 2023-09-11 PROCEDURE — 87651 STREP A DNA AMP PROBE: CPT | Performed by: NURSE PRACTITIONER

## 2023-09-11 PROCEDURE — 80053 COMPREHEN METABOLIC PANEL: CPT | Performed by: NURSE PRACTITIONER

## 2023-09-11 PROCEDURE — 86308 HETEROPHILE ANTIBODY SCREEN: CPT | Performed by: NURSE PRACTITIONER

## 2023-09-11 PROCEDURE — 0240U COVID/FLU A&B PCR: CPT | Performed by: NURSE PRACTITIONER

## 2023-09-11 PROCEDURE — 96374 THER/PROPH/DIAG INJ IV PUSH: CPT

## 2023-09-11 PROCEDURE — 63600175 PHARM REV CODE 636 W HCPCS: Performed by: NURSE PRACTITIONER

## 2023-09-11 PROCEDURE — 85025 COMPLETE CBC W/AUTO DIFF WBC: CPT | Performed by: NURSE PRACTITIONER

## 2023-09-11 RX ORDER — LEVOFLOXACIN 750 MG/1
750 TABLET ORAL DAILY
Qty: 7 TABLET | Refills: 0 | Status: SHIPPED | OUTPATIENT
Start: 2023-09-11 | End: 2023-09-18

## 2023-09-11 RX ORDER — LABETALOL HYDROCHLORIDE 5 MG/ML
10 INJECTION, SOLUTION INTRAVENOUS
Status: COMPLETED | OUTPATIENT
Start: 2023-09-11 | End: 2023-09-11

## 2023-09-11 RX ADMIN — LABETALOL HYDROCHLORIDE 10 MG: 5 INJECTION, SOLUTION INTRAVENOUS at 08:09

## 2023-09-12 NOTE — ED PROVIDER NOTES
Encounter Date: 9/11/2023       History     Chief Complaint   Patient presents with    Cough     Pt c/o sore throat, cough, and runny nose x2 days. Denies fever.      See MDM    The history is provided by the patient.     Review of patient's allergies indicates:  No Known Allergies  Past Medical History:   Diagnosis Date    Anemia, unspecified     Hip pain, left     Kidney lesion 2018    Lumbar radiculopathy     Pancreatitis     PCOS (polycystic ovarian syndrome)     PID (pelvic inflammatory disease)     Sciatica of left side     Sciatica without back pain, left     Swelling of hip joint, left      Past Surgical History:   Procedure Laterality Date    CHOLECYSTECTOMY  2019    LUMBAR LAMINECTOMY WITH DISCECTOMY  2020    Dr J Luis Rothman     Family History   Problem Relation Age of Onset    Diabetes Mellitus Maternal Grandmother     Cancer Maternal Uncle         metastatic    Cancer Maternal Uncle         metasastic     Social History     Tobacco Use    Smoking status: Every Day    Smokeless tobacco: Never    Tobacco comments:     vapes daily   Substance Use Topics    Alcohol use: Not Currently    Drug use: Yes     Frequency: 5.0 times per week     Types: Marijuana     Review of Systems   Constitutional:  Negative for fever.   HENT:  Positive for congestion and sore throat.    Respiratory:  Positive for cough. Negative for shortness of breath.    Cardiovascular:  Negative for chest pain.   Gastrointestinal:  Negative for abdominal pain.   Genitourinary:  Negative for difficulty urinating and dysuria.   Musculoskeletal:  Negative for gait problem.   Skin:  Negative for color change.   Neurological:  Negative for dizziness, speech difficulty and headaches.   Psychiatric/Behavioral:  Negative for hallucinations and suicidal ideas.    All other systems reviewed and are negative.      Physical Exam     Initial Vitals [09/11/23 1905]   BP Pulse Resp Temp SpO2   (!) 191/119 109 20 98.2 °F (36.8 °C) 100 %      MAP       --          Physical Exam    Nursing note and vitals reviewed.  Constitutional: She appears well-developed and well-nourished.   HENT:   Head: Normocephalic.   Mouth/Throat: No oropharyngeal exudate.   Eyes: EOM are normal.   Neck:   Normal range of motion.  Cardiovascular:  Normal rate, regular rhythm, normal heart sounds and intact distal pulses.           Pulmonary/Chest: Breath sounds normal. No respiratory distress.   Abdominal: Abdomen is soft. Bowel sounds are normal. There is no abdominal tenderness.   Musculoskeletal:         General: Normal range of motion.      Cervical back: Normal range of motion.     Neurological: She is alert and oriented to person, place, and time. She has normal strength.   Skin: Skin is warm and dry.   Psychiatric: She has a normal mood and affect. Her behavior is normal. Judgment and thought content normal.         ED Course   Procedures  Labs Reviewed   COMPREHENSIVE METABOLIC PANEL - Abnormal; Notable for the following components:       Result Value    Glucose Level 117 (*)     Blood Urea Nitrogen 6.0 (*)     Albumin Level 3.2 (*)     Globulin 3.6 (*)     Albumin/Globulin Ratio 0.9 (*)     Alanine Aminotransferase 109 (*)     Aspartate Aminotransferase 100 (*)     All other components within normal limits   CBC WITH DIFFERENTIAL - Abnormal; Notable for the following components:    MCHC 32.0 (*)     All other components within normal limits   COVID/FLU A&B PCR - Normal    Narrative:     The Xpert Xpress SARS-CoV-2/FLU/RSV plus is a rapid, multiplexed real-time PCR test intended for the simultaneous qualitative detection and differentiation of SARS-CoV-2, Influenza A, Influenza B, and respiratory syncytial virus (RSV) viral RNA in either nasopharyngeal swab or nasal swab specimens.         STREP GROUP A BY PCR - Normal    Narrative:     The Xpert Xpress Strep A test is a rapid, qualitative in vitro diagnostic test for the detection of Streptococcus pyogenes (Group A ß-hemolytic  Streptococcus, Strep A) in throat swab specimens from patients with signs and symptoms of pharyngitis.     MONONUCLEOSIS SCREEN - Normal   CBC W/ AUTO DIFFERENTIAL    Narrative:     The following orders were created for panel order CBC auto differential.  Procedure                               Abnormality         Status                     ---------                               -----------         ------                     CBC with Differential[[id number]]        Abnormal            Final result                 Please view results for these tests on the individual orders.          Imaging Results              X-Ray Chest PA And Lateral (Preliminary result)  Result time 09/11/23 21:20:56      Wet Read by Lucas Renee FNP (09/11/23 21:20:56, [facility], Emergency Medicine)    Possible right sided infiltrate                                     Medications   labetaloL injection 10 mg (10 mg Intravenous Given 9/11/23 2000)     Medical Decision Making  Historian:  Patient.  Patient is a 38-year-old female  that presents with cough, sore throat, congestion that has been present yesterday. Associated symptoms nothing. Surrounding information is nothing. Exacerbated by nothing. Relieved by nothing. Patient treatment prior to arrival none. Risk factors include none. Other history pertaining to this complaint nothing.   Assessment:  See physical exam.  DD:  COVID, flu, upper respiratory infection, strep    Amount and/or Complexity of Data Reviewed  Labs: ordered.     Details: Lab studies unremarkable except for elevated liver functions  Radiology: ordered and independent interpretation performed.     Details: Infiltrate right middle lobe  Discussion of management or test interpretation with external provider(s): Patient needs to follow up with her PCP for elevated liver functions.  I will put her on some Levaquin for a pneumonia.  No medical or surgical consult to indicated in the ER.  No  social determinants that affect healthcare were noted.    Risk  Prescription drug management.                               Clinical Impression:   Final diagnoses:  [R05.9] Cough  [J18.9] Pneumonia of right middle lobe due to infectious organism (Primary)  [R79.89] Elevated LFTs        ED Disposition Condition    Discharge Stable          ED Prescriptions       Medication Sig Dispense Start Date End Date Auth. Provider    levoFLOXacin (LEVAQUIN) 750 MG tablet Take 1 tablet (750 mg total) by mouth once daily. for 7 days 7 tablet 9/11/2023 9/18/2023 Lucas Renee FNP          Follow-up Information       Follow up With Specialties Details Why Contact Info    Your Primary Care Provider  Call in 3 days ed follow up              Lucas Renee FNP  09/11/23 3059

## 2023-09-14 ENCOUNTER — LAB VISIT (OUTPATIENT)
Dept: LAB | Facility: HOSPITAL | Age: 38
End: 2023-09-14
Attending: FAMILY MEDICINE
Payer: MEDICAID

## 2023-09-14 DIAGNOSIS — Z11.4 ENCOUNTER FOR SCREENING FOR HIV: ICD-10-CM

## 2023-09-14 DIAGNOSIS — Z13.1 DIABETES MELLITUS SCREENING: ICD-10-CM

## 2023-09-14 DIAGNOSIS — I10 PRIMARY HYPERTENSION: ICD-10-CM

## 2023-09-14 DIAGNOSIS — Z00.00 ROUTINE GENERAL MEDICAL EXAMINATION AT A HEALTH CARE FACILITY: ICD-10-CM

## 2023-09-14 LAB
ALBUMIN SERPL-MCNC: 3.2 G/DL (ref 3.5–5)
ALBUMIN/GLOB SERPL: 0.9 RATIO (ref 1.1–2)
ALP SERPL-CCNC: 146 UNIT/L (ref 40–150)
ALT SERPL-CCNC: 92 UNIT/L (ref 0–55)
AST SERPL-CCNC: 36 UNIT/L (ref 5–34)
BASOPHILS # BLD AUTO: 0.01 X10(3)/MCL
BASOPHILS NFR BLD AUTO: 0.2 %
BILIRUB SERPL-MCNC: 0.3 MG/DL
BUN SERPL-MCNC: 10 MG/DL (ref 7–18.7)
CALCIUM SERPL-MCNC: 9 MG/DL (ref 8.4–10.2)
CHLORIDE SERPL-SCNC: 106 MMOL/L (ref 98–107)
CHOLEST SERPL-MCNC: 133 MG/DL
CHOLEST/HDLC SERPL: 3 {RATIO} (ref 0–5)
CO2 SERPL-SCNC: 28 MMOL/L (ref 22–29)
CREAT SERPL-MCNC: 0.79 MG/DL (ref 0.55–1.02)
EOSINOPHIL # BLD AUTO: 0.14 X10(3)/MCL (ref 0–0.9)
EOSINOPHIL NFR BLD AUTO: 2.4 %
ERYTHROCYTE [DISTWIDTH] IN BLOOD BY AUTOMATED COUNT: 12.9 % (ref 11.5–17)
EST. AVERAGE GLUCOSE BLD GHB EST-MCNC: 102.5 MG/DL
GFR SERPLBLD CREATININE-BSD FMLA CKD-EPI: >60 MLS/MIN/1.73/M2
GLOBULIN SER-MCNC: 3.7 GM/DL (ref 2.4–3.5)
GLUCOSE SERPL-MCNC: 91 MG/DL (ref 74–100)
HBA1C MFR BLD: 5.2 %
HCT VFR BLD AUTO: 38.3 % (ref 37–47)
HCV AB SERPL QL IA: NONREACTIVE
HDLC SERPL-MCNC: 45 MG/DL (ref 35–60)
HGB BLD-MCNC: 12.2 G/DL (ref 12–16)
HIV 1+2 AB+HIV1 P24 AG SERPL QL IA: NONREACTIVE
IMM GRANULOCYTES # BLD AUTO: 0.01 X10(3)/MCL (ref 0–0.04)
IMM GRANULOCYTES NFR BLD AUTO: 0.2 %
LDLC SERPL CALC-MCNC: 75 MG/DL (ref 50–140)
LYMPHOCYTES # BLD AUTO: 1.66 X10(3)/MCL (ref 0.6–4.6)
LYMPHOCYTES NFR BLD AUTO: 28 %
MCH RBC QN AUTO: 28.3 PG (ref 27–31)
MCHC RBC AUTO-ENTMCNC: 31.9 G/DL (ref 33–36)
MCV RBC AUTO: 88.9 FL (ref 80–94)
MONOCYTES # BLD AUTO: 0.37 X10(3)/MCL (ref 0.1–1.3)
MONOCYTES NFR BLD AUTO: 6.3 %
NEUTROPHILS # BLD AUTO: 3.73 X10(3)/MCL (ref 2.1–9.2)
NEUTROPHILS NFR BLD AUTO: 62.9 %
NRBC BLD AUTO-RTO: 0 %
PLATELET # BLD AUTO: 383 X10(3)/MCL (ref 130–400)
PMV BLD AUTO: 9.3 FL (ref 7.4–10.4)
POTASSIUM SERPL-SCNC: 3.4 MMOL/L (ref 3.5–5.1)
PROT SERPL-MCNC: 6.9 GM/DL (ref 6.4–8.3)
RBC # BLD AUTO: 4.31 X10(6)/MCL (ref 4.2–5.4)
SODIUM SERPL-SCNC: 141 MMOL/L (ref 136–145)
T4 FREE SERPL-MCNC: 1.03 NG/DL (ref 0.7–1.48)
TRIGL SERPL-MCNC: 66 MG/DL (ref 37–140)
TSH SERPL-ACNC: 3.75 UIU/ML (ref 0.35–4.94)
VLDLC SERPL CALC-MCNC: 13 MG/DL
WBC # SPEC AUTO: 5.92 X10(3)/MCL (ref 4.5–11.5)

## 2023-09-14 PROCEDURE — 84439 ASSAY OF FREE THYROXINE: CPT

## 2023-09-14 PROCEDURE — 86803 HEPATITIS C AB TEST: CPT

## 2023-09-14 PROCEDURE — 87389 HIV-1 AG W/HIV-1&-2 AB AG IA: CPT

## 2023-09-14 PROCEDURE — 85025 COMPLETE CBC W/AUTO DIFF WBC: CPT

## 2023-09-14 PROCEDURE — 83036 HEMOGLOBIN GLYCOSYLATED A1C: CPT

## 2023-09-14 PROCEDURE — 36415 COLL VENOUS BLD VENIPUNCTURE: CPT

## 2023-09-14 PROCEDURE — 80053 COMPREHEN METABOLIC PANEL: CPT

## 2023-09-14 PROCEDURE — 84443 ASSAY THYROID STIM HORMONE: CPT

## 2023-09-14 PROCEDURE — 80061 LIPID PANEL: CPT

## 2023-09-18 ENCOUNTER — OFFICE VISIT (OUTPATIENT)
Dept: FAMILY MEDICINE | Facility: CLINIC | Age: 38
End: 2023-09-18
Payer: MEDICAID

## 2023-09-18 VITALS
HEART RATE: 78 BPM | OXYGEN SATURATION: 99 % | SYSTOLIC BLOOD PRESSURE: 146 MMHG | HEIGHT: 63 IN | BODY MASS INDEX: 31.71 KG/M2 | DIASTOLIC BLOOD PRESSURE: 86 MMHG | RESPIRATION RATE: 18 BRPM | WEIGHT: 179 LBS | TEMPERATURE: 98 F

## 2023-09-18 DIAGNOSIS — I10 PRIMARY HYPERTENSION: Primary | ICD-10-CM

## 2023-09-18 DIAGNOSIS — R74.8 ELEVATED LIVER ENZYMES: ICD-10-CM

## 2023-09-18 DIAGNOSIS — J18.9 PNEUMONIA DUE TO INFECTIOUS ORGANISM, UNSPECIFIED LATERALITY, UNSPECIFIED PART OF LUNG: ICD-10-CM

## 2023-09-18 PROCEDURE — 3044F PR MOST RECENT HEMOGLOBIN A1C LEVEL <7.0%: ICD-10-PCS | Mod: CPTII,,, | Performed by: FAMILY MEDICINE

## 2023-09-18 PROCEDURE — 1159F MED LIST DOCD IN RCRD: CPT | Mod: CPTII,,, | Performed by: FAMILY MEDICINE

## 2023-09-18 PROCEDURE — 99214 OFFICE O/P EST MOD 30 MIN: CPT | Mod: ,,, | Performed by: FAMILY MEDICINE

## 2023-09-18 PROCEDURE — 1159F PR MEDICATION LIST DOCUMENTED IN MEDICAL RECORD: ICD-10-PCS | Mod: CPTII,,, | Performed by: FAMILY MEDICINE

## 2023-09-18 PROCEDURE — 3077F SYST BP >= 140 MM HG: CPT | Mod: CPTII,,, | Performed by: FAMILY MEDICINE

## 2023-09-18 PROCEDURE — 3077F PR MOST RECENT SYSTOLIC BLOOD PRESSURE >= 140 MM HG: ICD-10-PCS | Mod: CPTII,,, | Performed by: FAMILY MEDICINE

## 2023-09-18 PROCEDURE — 3008F PR BODY MASS INDEX (BMI) DOCUMENTED: ICD-10-PCS | Mod: CPTII,,, | Performed by: FAMILY MEDICINE

## 2023-09-18 PROCEDURE — 3008F BODY MASS INDEX DOCD: CPT | Mod: CPTII,,, | Performed by: FAMILY MEDICINE

## 2023-09-18 PROCEDURE — 3080F DIAST BP >= 90 MM HG: CPT | Mod: CPTII,,, | Performed by: FAMILY MEDICINE

## 2023-09-18 PROCEDURE — 1160F RVW MEDS BY RX/DR IN RCRD: CPT | Mod: CPTII,,, | Performed by: FAMILY MEDICINE

## 2023-09-18 PROCEDURE — 3044F HG A1C LEVEL LT 7.0%: CPT | Mod: CPTII,,, | Performed by: FAMILY MEDICINE

## 2023-09-18 PROCEDURE — 99214 PR OFFICE/OUTPT VISIT, EST, LEVL IV, 30-39 MIN: ICD-10-PCS | Mod: ,,, | Performed by: FAMILY MEDICINE

## 2023-09-18 PROCEDURE — 1160F PR REVIEW ALL MEDS BY PRESCRIBER/CLIN PHARMACIST DOCUMENTED: ICD-10-PCS | Mod: CPTII,,, | Performed by: FAMILY MEDICINE

## 2023-09-18 PROCEDURE — 3080F PR MOST RECENT DIASTOLIC BLOOD PRESSURE >= 90 MM HG: ICD-10-PCS | Mod: CPTII,,, | Performed by: FAMILY MEDICINE

## 2023-09-18 RX ORDER — AMLODIPINE BESYLATE 5 MG/1
5 TABLET ORAL DAILY
Qty: 30 TABLET | Refills: 5 | Status: SHIPPED | OUTPATIENT
Start: 2023-09-18 | End: 2024-03-16

## 2023-09-18 NOTE — PROGRESS NOTES
Patient ID: [id number]     Chief Complaint: Follow-up        HPI:     Carmen Laurent is a 38 y.o. female here today for Follow-up. Recently diagnosed with Pneumonia, on her last tablet of levofloxacin. She is starting to feel better. Elevated liver enzymes appear to be trending down, likely elevated from acute illness. BP elevated today, has not been taking her amlodipine.       ----------------------------  Anemia, unspecified  Hip pain, left  Kidney lesion  Lumbar radiculopathy  Pancreatitis  PCOS (polycystic ovarian syndrome)  PID (pelvic inflammatory disease)  Sciatica of left side  Sciatica without back pain, left  Swelling of hip joint, left     Past Surgical History:   Procedure Laterality Date    CHOLECYSTECTOMY  2019    LUMBAR LAMINECTOMY WITH DISCECTOMY  2020    Dr J Luis Rothman       Review of patient's allergies indicates:  No Known Allergies    Outpatient Medications Marked as Taking for the 9/18/23 encounter (Office Visit) with Dennis Bell,    Medication Sig Dispense Refill    gabapentin (NEURONTIN) 800 MG tablet Take 800 mg by mouth 3 (three) times daily.      levoFLOXacin (LEVAQUIN) 750 MG tablet Take 1 tablet (750 mg total) by mouth once daily. for 7 days 7 tablet 0    [DISCONTINUED] amLODIPine (NORVASC) 5 MG tablet Take 5 mg by mouth once daily.         Social History     Socioeconomic History    Marital status: Single    Number of children: 0   Tobacco Use    Smoking status: Every Day    Smokeless tobacco: Never    Tobacco comments:     vapes daily   Substance and Sexual Activity    Alcohol use: Not Currently    Drug use: Yes     Frequency: 5.0 times per week     Types: Marijuana    Sexual activity: Yes     Social Determinants of Health     Financial Resource Strain: Low Risk  (6/27/2023)    Overall Financial Resource Strain (CARDIA)     Difficulty of Paying Living Expenses: Not hard at all   Food Insecurity: No Food Insecurity (6/27/2023)    Hunger Vital Sign     Worried About Running Out  of Food in the Last Year: Never true     Ran Out of Food in the Last Year: Never true   Transportation Needs: No Transportation Needs (6/27/2023)    PRAPARE - Transportation     Lack of Transportation (Medical): No     Lack of Transportation (Non-Medical): No   Physical Activity: Sufficiently Active (6/27/2023)    Exercise Vital Sign     Days of Exercise per Week: 5 days     Minutes of Exercise per Session: 30 min   Stress: No Stress Concern Present (6/27/2023)    [employer] [place] [employer] - Occupational Stress Questionnaire     Feeling of Stress : Only a little   Social Connections: Moderately Integrated (6/27/2023)    Social Connection and Isolation Panel [NHANES]     Frequency of Communication with Friends and Family: More than three times a week     Frequency of Social Gatherings with Friends and Family: Three times a week     Attends Bahai Services: 1 to 4 times per year     Active Member of Clubs or Organizations: Yes     Attends Club or Organization Meetings: 1 to 4 times per year     Marital Status: Never    Housing Stability: Low Risk  (6/27/2023)    Housing Stability Vital Sign     Unable to Pay for Housing in the Last Year: No     Number of Places Lived in the Last Year: 1     Unstable Housing in the Last Year: No        Family History   Problem Relation Age of Onset    Diabetes Mellitus Maternal Grandmother     Cancer Maternal Uncle         metastatic    Cancer Maternal Uncle         metasastic        Patient Care Team:  Dennis Bell DO as PCP - General (Family Medicine)     Subjective:     Review of Systems   Constitutional:  Negative for chills and fever.   Respiratory:  Positive for cough. Negative for shortness of breath.    Cardiovascular:  Negative for chest pain.   Gastrointestinal:  Negative for constipation and diarrhea.   Neurological:  Negative for dizziness and headaches.   Psychiatric/Behavioral:  The patient does not have insomnia.        See HPI for  "details  All Other ROS: Negative except as stated in HPI.       Objective:     BP (!) 161/103 (BP Location: Right arm)   Pulse 99   Temp 98.3 °F (36.8 °C) (Tympanic)   Resp 18   Ht 5' 2.99" (1.6 m)   Wt 81.2 kg (179 lb)   SpO2 99%   BMI 31.72 kg/m²     Physical Exam  Vitals reviewed.   Constitutional:       General: She is not in acute distress.     Appearance: Normal appearance.   Cardiovascular:      Rate and Rhythm: Normal rate and regular rhythm.      Heart sounds: No murmur heard.     No friction rub. No gallop.   Pulmonary:      Effort: No respiratory distress.      Breath sounds: No wheezing, rhonchi or rales.   Musculoskeletal:         General: No swelling, tenderness or deformity.      Right lower leg: No edema.      Left lower leg: No edema.   Skin:     General: Skin is warm and dry.      Findings: No lesion or rash.   Neurological:      General: No focal deficit present.      Mental Status: She is alert.   Psychiatric:         Mood and Affect: Mood normal.         Assessment/Plan:     1. Primary hypertension  -     amLODIPine (NORVASC) 5 MG tablet; Take 1 tablet (5 mg total) by mouth once daily.  Dispense: 30 tablet; Refill: 5    2. Pneumonia due to infectious organism, unspecified laterality, unspecified part of lung    Resolving, advised patient to complete antibiotic course as prescribed.   3. Elevated liver enzymes  -     Comprehensive Metabolic Panel; Future; Expected date: 09/18/2023    Follow up:     Follow up in about 4 weeks (around 10/16/2023) for Follow up. In addition to their scheduled follow up, the patient has also been instructed to follow up on as needed basis.         "

## 2023-12-09 ENCOUNTER — HOSPITAL ENCOUNTER (EMERGENCY)
Facility: HOSPITAL | Age: 38
Discharge: HOME | End: 2023-12-09
Attending: INTERNAL MEDICINE
Payer: MEDICAID

## 2023-12-09 VITALS
BODY MASS INDEX: 31.33 KG/M2 | WEIGHT: 176.81 LBS | DIASTOLIC BLOOD PRESSURE: 77 MMHG | RESPIRATION RATE: 16 BRPM | SYSTOLIC BLOOD PRESSURE: 149 MMHG | HEART RATE: 98 BPM | OXYGEN SATURATION: 98 % | TEMPERATURE: 100 F

## 2023-12-09 DIAGNOSIS — N39.0 URINARY TRACT INFECTION WITHOUT HEMATURIA, SITE UNSPECIFIED: ICD-10-CM

## 2023-12-09 DIAGNOSIS — J10.1 INFLUENZA A: Primary | ICD-10-CM

## 2023-12-09 LAB
APPEARANCE UR: ABNORMAL
B-HCG SERPL QL: NEGATIVE
BACTERIA #/AREA URNS AUTO: ABNORMAL /HPF
BASOPHILS # BLD AUTO: 0.01 X10(3)/MCL
BASOPHILS NFR BLD AUTO: 0.3 %
BILIRUB UR QL STRIP.AUTO: NEGATIVE
COLOR UR AUTO: YELLOW
EOSINOPHIL # BLD AUTO: 0 X10(3)/MCL (ref 0–0.9)
EOSINOPHIL NFR BLD AUTO: 0 %
ERYTHROCYTE [DISTWIDTH] IN BLOOD BY AUTOMATED COUNT: 13.2 % (ref 11.5–17)
FLUAV AG UPPER RESP QL IA.RAPID: DETECTED
FLUBV AG UPPER RESP QL IA.RAPID: NOT DETECTED
GLUCOSE UR QL STRIP.AUTO: NEGATIVE
HCT VFR BLD AUTO: 36.6 % (ref 37–47)
HGB BLD-MCNC: 12.1 G/DL (ref 12–16)
IMM GRANULOCYTES # BLD AUTO: 0.01 X10(3)/MCL (ref 0–0.04)
IMM GRANULOCYTES NFR BLD AUTO: 0.3 %
KETONES UR QL STRIP.AUTO: NEGATIVE
LEUKOCYTE ESTERASE UR QL STRIP.AUTO: NEGATIVE
LYMPHOCYTES # BLD AUTO: 0.5 X10(3)/MCL (ref 0.6–4.6)
LYMPHOCYTES NFR BLD AUTO: 14.7 %
MCH RBC QN AUTO: 28.5 PG (ref 27–31)
MCHC RBC AUTO-ENTMCNC: 33.1 G/DL (ref 33–36)
MCV RBC AUTO: 86.1 FL (ref 80–94)
MONOCYTES # BLD AUTO: 0.51 X10(3)/MCL (ref 0.1–1.3)
MONOCYTES NFR BLD AUTO: 15 %
MUCOUS THREADS URNS QL MICRO: ABNORMAL /LPF
NEUTROPHILS # BLD AUTO: 2.36 X10(3)/MCL (ref 2.1–9.2)
NEUTROPHILS NFR BLD AUTO: 69.7 %
NITRITE UR QL STRIP.AUTO: NEGATIVE
PH UR STRIP.AUTO: 6 [PH]
PLATELET # BLD AUTO: 234 X10(3)/MCL (ref 130–400)
PMV BLD AUTO: 9.1 FL (ref 7.4–10.4)
PROT UR QL STRIP.AUTO: ABNORMAL
RBC # BLD AUTO: 4.25 X10(6)/MCL (ref 4.2–5.4)
RBC #/AREA URNS AUTO: ABNORMAL /HPF
RBC UR QL AUTO: ABNORMAL
RSV A 5' UTR RNA NPH QL NAA+PROBE: NOT DETECTED
SARS-COV-2 RNA RESP QL NAA+PROBE: NOT DETECTED
SP GR UR STRIP.AUTO: >=1.03 (ref 1–1.03)
SQUAMOUS #/AREA URNS AUTO: ABNORMAL /HPF
STREP A PCR (OHS): NOT DETECTED
UROBILINOGEN UR STRIP-ACNC: 0.2
WBC # SPEC AUTO: 3.39 X10(3)/MCL (ref 4.5–11.5)
WBC #/AREA URNS AUTO: ABNORMAL /HPF

## 2023-12-09 PROCEDURE — 63600175 PHARM REV CODE 636 W HCPCS: Performed by: NURSE PRACTITIONER

## 2023-12-09 PROCEDURE — 25000003 PHARM REV CODE 250: Performed by: NURSE PRACTITIONER

## 2023-12-09 PROCEDURE — 25000003 PHARM REV CODE 250: Performed by: INTERNAL MEDICINE

## 2023-12-09 PROCEDURE — 99284 EMERGENCY DEPT VISIT MOD MDM: CPT

## 2023-12-09 PROCEDURE — 87651 STREP A DNA AMP PROBE: CPT | Performed by: INTERNAL MEDICINE

## 2023-12-09 PROCEDURE — 0241U COVID/RSV/FLU A&B PCR: CPT | Performed by: INTERNAL MEDICINE

## 2023-12-09 PROCEDURE — 81025 URINE PREGNANCY TEST: CPT | Performed by: INTERNAL MEDICINE

## 2023-12-09 PROCEDURE — 85025 COMPLETE CBC W/AUTO DIFF WBC: CPT | Performed by: NURSE PRACTITIONER

## 2023-12-09 PROCEDURE — 81001 URINALYSIS AUTO W/SCOPE: CPT | Performed by: INTERNAL MEDICINE

## 2023-12-09 RX ORDER — IBUPROFEN 600 MG/1
600 TABLET ORAL
Status: COMPLETED | OUTPATIENT
Start: 2023-12-09 | End: 2023-12-09

## 2023-12-09 RX ORDER — PREDNISONE 20 MG/1
40 TABLET ORAL
Status: COMPLETED | OUTPATIENT
Start: 2023-12-09 | End: 2023-12-09

## 2023-12-09 RX ORDER — PREDNISONE 20 MG/1
40 TABLET ORAL DAILY
Qty: 10 TABLET | Refills: 0 | Status: SHIPPED | OUTPATIENT
Start: 2023-12-09 | End: 2023-12-14

## 2023-12-09 RX ORDER — OSELTAMIVIR PHOSPHATE 75 MG/1
75 CAPSULE ORAL 2 TIMES DAILY
Qty: 10 CAPSULE | Refills: 0 | Status: SHIPPED | OUTPATIENT
Start: 2023-12-09 | End: 2023-12-14

## 2023-12-09 RX ORDER — OSELTAMIVIR PHOSPHATE 75 MG/1
75 CAPSULE ORAL
Status: COMPLETED | OUTPATIENT
Start: 2023-12-09 | End: 2023-12-09

## 2023-12-09 RX ORDER — ACETAMINOPHEN 500 MG
1000 TABLET ORAL
Status: COMPLETED | OUTPATIENT
Start: 2023-12-09 | End: 2023-12-09

## 2023-12-09 RX ORDER — NITROFURANTOIN 25; 75 MG/1; MG/1
100 CAPSULE ORAL 2 TIMES DAILY
Qty: 10 CAPSULE | Refills: 0 | Status: SHIPPED | OUTPATIENT
Start: 2023-12-09 | End: 2023-12-14

## 2023-12-09 RX ORDER — NITROFURANTOIN 25; 75 MG/1; MG/1
100 CAPSULE ORAL
Status: COMPLETED | OUTPATIENT
Start: 2023-12-09 | End: 2023-12-09

## 2023-12-09 RX ADMIN — NITROFURANTOIN (MONOHYDRATE/MACROCRYSTALS) 100 MG: 75; 25 CAPSULE ORAL at 10:12

## 2023-12-09 RX ADMIN — PREDNISONE 40 MG: 20 TABLET ORAL at 10:12

## 2023-12-09 RX ADMIN — IBUPROFEN 600 MG: 600 TABLET, FILM COATED ORAL at 08:12

## 2023-12-09 RX ADMIN — ACETAMINOPHEN 1000 MG: 500 TABLET, FILM COATED ORAL at 10:12

## 2023-12-09 RX ADMIN — OSELTAMIVIR PHOSPHATE 75 MG: 75 CAPSULE ORAL at 10:12

## 2023-12-10 NOTE — ED PROVIDER NOTES
Encounter Date: 12/9/2023       History     Chief Complaint   Patient presents with    Fever     Fever x2 days, last dose of Tylenol at 1400. Has abscess to buttock where she's had cellulitis in the past     The patient is a 38 year old female without significant history who presnts to the ED for treatment of fever onset less than 72 hours ago.  She is reporting generalized flu-like symptoms.  She states that her body aches, cough, clear nasal discharge.  Denies any shortness of breath, denies any chest pain, denies any other associated complaints or conditions.  She also states that she is a resolving abscess centrally on her left buttock, states that she is been putting topical Bactroban on it, states that it is much better and without any drainage.  No other complaints or conditions.      Review of patient's allergies indicates:  No Known Allergies  Past Medical History:   Diagnosis Date    Anemia, unspecified     Hip pain, left     Kidney lesion 2018    Lumbar radiculopathy     Pancreatitis     PCOS (polycystic ovarian syndrome)     PID (pelvic inflammatory disease)     Sciatica of left side     Sciatica without back pain, left     Swelling of hip joint, left      Past Surgical History:   Procedure Laterality Date    CHOLECYSTECTOMY  2019    LUMBAR LAMINECTOMY WITH DISCECTOMY  2020    Dr J Luis Rothman     Family History   Problem Relation Age of Onset    Diabetes Mellitus Maternal Grandmother     Cancer Maternal Uncle         metastatic    Cancer Maternal Uncle         metasastic     Social History     Tobacco Use    Smoking status: Every Day    Smokeless tobacco: Never    Tobacco comments:     vapes daily   Substance Use Topics    Alcohol use: Not Currently    Drug use: Yes     Frequency: 5.0 times per week     Types: Marijuana     Review of Systems   All other systems reviewed and are negative.      Physical Exam     Initial Vitals [12/09/23 2040]   BP Pulse Resp Temp SpO2   (!) 150/91 101 20 (!) 101.1 °F  (38.4 °C) 96 %      MAP       --         Physical Exam    Nursing note and vitals reviewed.  Constitutional: She appears well-developed and well-nourished.   Ill appearing   HENT:   Head: Normocephalic and atraumatic.   Nose: Nose normal.   Mouth/Throat: Oropharynx is clear and moist. No oropharyngeal exudate.   Eyes: EOM are normal. Pupils are equal, round, and reactive to light.   Conjunctiva injected bilaterally   Neck: Neck supple.   Normal range of motion.  Cardiovascular:  Normal rate, regular rhythm and normal heart sounds.           Pulmonary/Chest: Breath sounds normal.   Abdominal: Abdomen is soft.   Musculoskeletal:         General: Normal range of motion.      Cervical back: Normal range of motion and neck supple.     Neurological: She is alert and oriented to person, place, and time. She has normal strength. GCS score is 15. GCS eye subscore is 4. GCS verbal subscore is 5. GCS motor subscore is 6.   Skin: Skin is warm and dry.   Resolving abscess centrally left buttock, no induration, no fluctuation, no drainage         ED Course   Procedures  Labs Reviewed   CBC WITH DIFFERENTIAL - Abnormal; Notable for the following components:       Result Value    WBC 3.39 (*)     Hct 36.6 (*)     Lymph # 0.50 (*)     All other components within normal limits   COVID/RSV/FLU A&B PCR - Abnormal; Notable for the following components:    Influenza A PCR Detected (*)     All other components within normal limits    Narrative:     The Xpert Xpress SARS-CoV-2/FLU/RSV plus is a rapid, multiplexed real-time PCR test intended for the simultaneous qualitative detection and differentiation of SARS-CoV-2, Influenza A, Influenza B, and respiratory syncytial virus (RSV) viral RNA in either nasopharyngeal swab or nasal swab specimens.         URINALYSIS, REFLEX TO URINE CULTURE - Abnormal; Notable for the following components:    Appearance, UA Slightly Cloudy (*)     Protein, UA 2+ (*)     Blood, UA Trace-Intact (*)     All other  components within normal limits   URINALYSIS, MICROSCOPIC - Abnormal; Notable for the following components:    Bacteria, UA Moderate (*)     Mucous, UA Trace (*)     Squamous Epithelial Cells, UA Moderate (*)     All other components within normal limits   PREGNANCY TEST, URINE RAPID - Normal   STREP GROUP A BY PCR - Normal    Narrative:     The Xpert Xpress Strep A test is a rapid, qualitative in vitro diagnostic test for the detection of Streptococcus pyogenes (Group A ß-hemolytic Streptococcus, Strep A) in throat swab specimens from patients with signs and symptoms of pharyngitis.     CBC W/ AUTO DIFFERENTIAL    Narrative:     The following orders were created for panel order CBC auto differential.  Procedure                               Abnormality         Status                     ---------                               -----------         ------                     CBC with Differential[[id number]]        Abnormal            Final result                 Please view results for these tests on the individual orders.        Admission on 12/09/2023   Component Date Value Ref Range Status    WBC 12/09/2023 3.39 (L)  4.50 - 11.50 x10(3)/mcL Final    RBC 12/09/2023 4.25  4.20 - 5.40 x10(6)/mcL Final    Hgb 12/09/2023 12.1  12.0 - 16.0 g/dL Final    Hct 12/09/2023 36.6 (L)  37.0 - 47.0 % Final    MCV 12/09/2023 86.1  80.0 - 94.0 fL Final    MCH 12/09/2023 28.5  27.0 - 31.0 pg Final    MCHC 12/09/2023 33.1  33.0 - 36.0 g/dL Final    RDW 12/09/2023 13.2  11.5 - 17.0 % Final    Platelet 12/09/2023 234  130 - 400 x10(3)/mcL Final    MPV 12/09/2023 9.1  7.4 - 10.4 fL Final    Neut % 12/09/2023 69.7  % Final    Lymph % 12/09/2023 14.7  % Final    Mono % 12/09/2023 15.0  % Final    Eos % 12/09/2023 0.0  % Final    Basophil % 12/09/2023 0.3  % Final    Lymph # 12/09/2023 0.50 (L)  0.6 - 4.6 x10(3)/mcL Final    Neut # 12/09/2023 2.36  2.1 - 9.2 x10(3)/mcL Final    Mono # 12/09/2023 0.51  0.1 - 1.3 x10(3)/mcL Final    Eos #  12/09/2023 0.00  0 - 0.9 x10(3)/mcL Final    Baso # 12/09/2023 0.01  <=0.2 x10(3)/mcL Final    IG# 12/09/2023 0.01  0 - 0.04 x10(3)/mcL Final    IG% 12/09/2023 0.3  % Final    Influenza A PCR 12/09/2023 Detected (A)  Not Detected Final    Influenza B PCR 12/09/2023 Not Detected  Not Detected Final    Respiratory Syncytial Virus PCR 12/09/2023 Not Detected  Not Detected Final    SARS-CoV-2 PCR 12/09/2023 Not Detected  Not Detected, Negative Final    Color, UA 12/09/2023 Yellow  Yellow, Light-Yellow, Dark Yellow, Stephanie, Straw Final    Appearance, UA 12/09/2023 Slightly Cloudy (A)  Clear Final    Specific Gravity, UA 12/09/2023 >=1.030  1.005 - 1.030 Final    pH, UA 12/09/2023 6.0  5.0 - 8.5 Final    Protein, UA 12/09/2023 2+ (A)  Negative Final    Glucose, UA 12/09/2023 Negative  Negative, Normal Final    Ketones, UA 12/09/2023 Negative  Negative Final    Blood, UA 12/09/2023 Trace-Intact (A)  Negative Final    Bilirubin, UA 12/09/2023 Negative  Negative Final    Urobilinogen, UA 12/09/2023 0.2  0.2, 1.0, Normal Final    Nitrites, UA 12/09/2023 Negative  Negative Final    Leukocyte Esterase, UA 12/09/2023 Negative  Negative Final    Beta hCG Qualitative, Urine 12/09/2023 Negative  Negative Final    STREP A PCR ([facility]) 12/09/2023 Not Detected  Not Detected Final    Bacteria, UA 12/09/2023 Moderate (A)  None Seen, Rare, Occasional /HPF Final    Mucous, UA 12/09/2023 Trace (A)  None Seen /LPF Final    RBC, UA 12/09/2023 0-2  None Seen, 0-2, 3-5, 0-5 /HPF Final    WBC, UA 12/09/2023 0-2  None Seen, 0-2, 3-5, 0-5 /HPF Final    Squamous Epithelial Cells, UA 12/09/2023 Moderate (A)  None Seen, Rare, Occasional, Occ /HPF Final        Imaging Results    None          Medications   acetaminophen tablet 1,000 mg (has no administration in time range)   predniSONE tablet 40 mg (has no administration in time range)   oseltamivir capsule 75 mg (has no administration in time range)   nitrofurantoin (macrocrystal-monohydrate) 100 MG  capsule 100 mg (has no administration in time range)   ibuprofen tablet 600 mg (600 mg Oral Given 12/9/23 2054)     Medical Decision Making  As stated in HPI.    DDX:  viral illness, strep, UTI, cellulitis    Amount and/or Complexity of Data Reviewed  Labs: ordered.     Details: WBC 3.39, cbc otherwise unremarkable.  Influenza A positive.  UA moderate bacteria also with moderate squamous epithelial cells.  Discussion of management or test interpretation with external provider(s): I will treat her for influenza A, cover her for a UTI to err on the side of caution.  I discussed her results with her in detail and all of her questions were answered.  She will follow up with her PCP or return to the ED if her condition worsens.    Risk  Prescription drug management.                                      Clinical Impression:  Final diagnoses:  [J10.1] Influenza A (Primary)  [N39.0] Urinary tract infection without hematuria, site unspecified          ED Disposition Condition    Discharge Stable          ED Prescriptions       Medication Sig Dispense Start Date End Date Auth. Provider    oseltamivir (TAMIFLU) 75 MG capsule Take 1 capsule (75 mg total) by mouth 2 (two) times daily. for 5 days 10 capsule 12/9/2023 12/14/2023 Monica Aviles FNP    predniSONE (DELTASONE) 20 MG tablet Take 2 tablets (40 mg total) by mouth once daily. for 5 days 10 tablet 12/9/2023 12/14/2023 Monica Aviles FNP    nitrofurantoin, macrocrystal-monohydrate, (MACROBID) 100 MG capsule Take 1 capsule (100 mg total) by mouth 2 (two) times daily. for 5 days 10 capsule 12/9/2023 12/14/2023 Monica Aviles FNP          Follow-up Information       Follow up With Specialties Details Why Contact Info    Dennis Bell, DO Family Medicine In 3 days  [address] [facility] [address]  [phone]      [facility] - Emergency Dept Emergency Medicine  As needed, If symptoms worsen [address]  [place]  [address]  [phone]             Monica Aviles, [facility]  12/09/23 9192

## 2023-12-14 ENCOUNTER — OFFICE VISIT (OUTPATIENT)
Dept: FAMILY MEDICINE | Facility: CLINIC | Age: 38
End: 2023-12-14
Payer: MEDICAID

## 2023-12-14 VITALS — BODY MASS INDEX: 31.18 KG/M2 | WEIGHT: 176 LBS | OXYGEN SATURATION: 98 % | RESPIRATION RATE: 20 BRPM | HEIGHT: 63 IN

## 2023-12-14 DIAGNOSIS — N30.00 ACUTE CYSTITIS WITHOUT HEMATURIA: ICD-10-CM

## 2023-12-14 DIAGNOSIS — J11.1 INFLUENZA: Primary | ICD-10-CM

## 2023-12-14 PROCEDURE — 3044F PR MOST RECENT HEMOGLOBIN A1C LEVEL <7.0%: ICD-10-PCS | Mod: CPTII,95,,

## 2023-12-14 PROCEDURE — 3008F PR BODY MASS INDEX (BMI) DOCUMENTED: ICD-10-PCS | Mod: CPTII,95,,

## 2023-12-14 PROCEDURE — 3044F HG A1C LEVEL LT 7.0%: CPT | Mod: CPTII,95,,

## 2023-12-14 PROCEDURE — 1160F PR REVIEW ALL MEDS BY PRESCRIBER/CLIN PHARMACIST DOCUMENTED: ICD-10-PCS | Mod: CPTII,95,,

## 2023-12-14 PROCEDURE — 1160F RVW MEDS BY RX/DR IN RCRD: CPT | Mod: CPTII,95,,

## 2023-12-14 PROCEDURE — 99214 PR OFFICE/OUTPT VISIT, EST, LEVL IV, 30-39 MIN: ICD-10-PCS | Mod: 95,,,

## 2023-12-14 PROCEDURE — 1159F PR MEDICATION LIST DOCUMENTED IN MEDICAL RECORD: ICD-10-PCS | Mod: CPTII,95,,

## 2023-12-14 PROCEDURE — 99214 OFFICE O/P EST MOD 30 MIN: CPT | Mod: 95,,,

## 2023-12-14 PROCEDURE — 3008F BODY MASS INDEX DOCD: CPT | Mod: CPTII,95,,

## 2023-12-14 PROCEDURE — 1159F MED LIST DOCD IN RCRD: CPT | Mod: CPTII,95,,

## 2023-12-14 NOTE — ASSESSMENT & PLAN NOTE
Continue Macrobid 100 mg BID.   Complete full course of antibiotics.  Reviewed urinalysis.   Patient's symptoms have improved.   Report any continuing symptoms after antibiotic completion such as nausea/vomiting, low back pain, blood in urine, burning with urination, or fever/body aches/chills.  Drink plenty of water daily. Avoid soda.  Women wipe front to back, urinate after sexual intercourse, and avoid irritating feminine products.  Urinate frequently. Do not hold urine for extended periods of time.    Wear cotton underwear and avoid tight fitting pants.   Use OTC AZO for relief of urinary spasms.

## 2023-12-14 NOTE — LETTER
December 14, 2023    Carmen Laurent  [address]  [place] [address]             [facility]  Family Medicine  [address] [facility] [address]  Phone: [phone]   December 14, 2023     Patient: Carmen Laurent   YOB: 1985   Date of Visit: 12/14/2023       To Whom it May Concern:    Carmen Laurent was seen in my clinic on 12/14/2023. She may return to work on 12/14/23 .    Please excuse her from missing work on Monday and Tuesday.     If you have any questions or concerns, please don't hesitate to call.    Sincerely,         Omar Kruse, NP

## 2023-12-14 NOTE — PROGRESS NOTES
Patient ID: [id number]     Chief Complaint: Follow-up (Er follow up for the flu/Tamiflu was prescribed and has 2 days left/), Cough, Fatigue, and Urinary Tract Infection      HPI:     This is a telemedicine note. Patient was treated using telemedicine, real time audio and video, according to [facility] protocols. Omar MUNOZ AGNP,  conducted the visit from the [facility]. The patient participated in the visit at a non-[facility] location selected by the patient, identified below. I am licensed in the state where the patient stated they are located. The patient stated that they understood and accepted the privacy and security risks to their information at their location. This visit is not recorded.    Patient was located in her car. She is traveling back from [place] from her son's national guard ceremony.     Carmen Laurent is a 38 y.o. female here today for a telemedicine visit for an ER follow up for a UTI and the flu. Overall, she is feeling a lot better today. She is taking her course of antibiotics for her UTI. She is fever free. She has not had a fever since Monday.     Past Medical History:   Diagnosis Date    Anemia, unspecified     Hip pain, left     Kidney lesion 2018    Lumbar radiculopathy     Pancreatitis     PCOS (polycystic ovarian syndrome)     PID (pelvic inflammatory disease)     Sciatica of left side     Sciatica without back pain, left     Swelling of hip joint, left         Past Surgical History:   Procedure Laterality Date    CHOLECYSTECTOMY  2019    LUMBAR LAMINECTOMY WITH DISCECTOMY  2020    Dr J Luis Rothman        Social History     Socioeconomic History    Marital status: Single    Number of children: 0   Tobacco Use    Smoking status: Every Day    Smokeless tobacco: Never    Tobacco comments:     vapes daily   Substance and Sexual Activity    Alcohol use: Not Currently    Drug use: Yes     Frequency: 5.0 times per week     Types: Marijuana    Sexual activity: Yes     Social  Determinants of Health     Financial Resource Strain: Low Risk  (12/14/2023)    Overall Financial Resource Strain (CARDIA)     Difficulty of Paying Living Expenses: Not hard at all   Food Insecurity: Food Insecurity Present (12/14/2023)    Hunger Vital Sign     Worried About Running Out of Food in the Last Year: Never true     Ran Out of Food in the Last Year: Sometimes true   Transportation Needs: No Transportation Needs (12/14/2023)    PRAPARE - Transportation     Lack of Transportation (Medical): No     Lack of Transportation (Non-Medical): No   Physical Activity: Unknown (12/14/2023)    Exercise Vital Sign     Days of Exercise per Week: Patient refused     Minutes of Exercise per Session: 30 min   Stress: No Stress Concern Present (6/27/2023)    Citizen of [place] [employer] - Occupational Stress Questionnaire     Feeling of Stress : Only a little   Social Connections: Moderately Integrated (12/14/2023)    Social Connection and Isolation Panel [NHANES]     Frequency of Communication with Friends and Family: Three times a week     Frequency of Social Gatherings with Friends and Family: Three times a week     Attends [employer] Services: 1 to 4 times per year     Active Member of Clubs or Organizations: Yes     Attends Club or Organization Meetings: Never     Marital Status: Never    Housing Stability: Low Risk  (12/14/2023)    Housing Stability Vital Sign     Unable to Pay for Housing in the Last Year: No     Number of Places Lived in the Last Year: 1     Unstable Housing in the Last Year: No        Current Outpatient Medications   Medication Instructions    amLODIPine (NORVASC) 5 mg, Oral, Daily    gabapentin (NEURONTIN) 800 mg, Oral, 3 times daily    nitrofurantoin, macrocrystal-monohydrate, (MACROBID) 100 MG capsule 100 mg, Oral, 2 times daily    oseltamivir (TAMIFLU) 75 mg, Oral, 2 times daily    predniSONE (DELTASONE) 40 mg, Oral, Daily       Review of patient's allergies indicates:  No  "Known Allergies     Patient Care Team:  Dennis Bell DO as PCP - General (Family Medicine)     Subjective:     Review of Systems    12 point review of systems conducted, negative except as stated in the history of present illness. See HPI for details.    Objective:     Visit Vitals  Resp 20   Ht 5' 3" (1.6 m)   Wt 79.8 kg (176 lb)   LMP  (LMP Unknown)   SpO2 98%   BMI 31.18 kg/m²       Physical Exam    Physical Exam: LIMITED DUE TO TELEMEDICINE RESTRICTIONS.  General: Alert and oriented, No acute distress.  Head: Normocephalic, Atraumatic.  Eye: Sclera non-icteric.  Neck/Thyroid:  Full range of motion.  Respiratory: Non-labored respirations, Symmetrical chest wall expansion.  Musculoskeletal: Normal range of motion.  Integumentary:  No visible suspicious lesions or rashes. No diaphoresis.   Neurologic: No focal deficits  Psychiatric: Normal interaction, Coherent speech, Euthymic mood, Appropriate affect       Assessment:       ICD-10-CM ICD-9-CM   1. Influenza  J11.1 487.1   2. Acute cystitis without hematuria  N30.00 595.0        Plan:     1. Influenza  Assessment & Plan:  The patient has been fever free since Monday. She is able to return back to work.       2. Acute cystitis without hematuria  Assessment & Plan:  Continue Macrobid 100 mg BID.   Complete full course of antibiotics.  Reviewed urinalysis.   Patient's symptoms have improved.   Report any continuing symptoms after antibiotic completion such as nausea/vomiting, low back pain, blood in urine, burning with urination, or fever/body aches/chills.  Drink plenty of water daily. Avoid soda.  Women wipe front to back, urinate after sexual intercourse, and avoid irritating feminine products.  Urinate frequently. Do not hold urine for extended periods of time.    Wear cotton underwear and avoid tight fitting pants.   Use OTC AZO for relief of urinary spasms.          No follow-ups on file. In addition to their scheduled follow up, the patient has also been " instructed to follow up on as needed basis.     Video Time Documentation:  Spent 10 minutes with patient face to face discussed health concerns. More than 50% of this time was spent in counseling and coordination of care.    Omar Kruse NP

## 2024-05-08 ENCOUNTER — HOSPITAL ENCOUNTER (EMERGENCY)
Facility: HOSPITAL | Age: 39
Discharge: LEFT BEFORE DISCHARGE | End: 2024-05-08
Payer: MEDICAID

## 2024-05-08 VITALS
HEIGHT: 63 IN | WEIGHT: 165 LBS | DIASTOLIC BLOOD PRESSURE: 129 MMHG | HEART RATE: 78 BPM | SYSTOLIC BLOOD PRESSURE: 210 MMHG | BODY MASS INDEX: 29.23 KG/M2 | TEMPERATURE: 98 F | OXYGEN SATURATION: 99 % | RESPIRATION RATE: 20 BRPM

## 2024-05-08 LAB
B-HCG UR QL: NEGATIVE
BACTERIA #/AREA URNS AUTO: ABNORMAL /HPF
BILIRUB UR QL STRIP.AUTO: NEGATIVE
CLARITY UR: CLEAR
COLOR UR AUTO: YELLOW
GLUCOSE UR QL STRIP: NEGATIVE
HGB UR QL STRIP: ABNORMAL
KETONES UR QL STRIP: NEGATIVE
LEUKOCYTE ESTERASE UR QL STRIP: ABNORMAL
MUCOUS THREADS URNS QL MICRO: ABNORMAL /LPF
NITRITE UR QL STRIP: POSITIVE
PH UR STRIP: 7 [PH]
PROT UR QL STRIP: ABNORMAL
RBC #/AREA URNS AUTO: ABNORMAL /HPF
SP GR UR STRIP.AUTO: 1.02 (ref 1–1.03)
SQUAMOUS #/AREA URNS AUTO: ABNORMAL /HPF
UROBILINOGEN UR STRIP-ACNC: 0.2
WBC #/AREA URNS AUTO: ABNORMAL /HPF

## 2024-05-08 PROCEDURE — 81003 URINALYSIS AUTO W/O SCOPE: CPT | Performed by: NURSE PRACTITIONER

## 2024-05-08 PROCEDURE — 99900041 HC LEFT WITHOUT BEING SEEN- EMERGENCY

## 2024-05-08 PROCEDURE — 87086 URINE CULTURE/COLONY COUNT: CPT | Performed by: NURSE PRACTITIONER

## 2024-05-08 PROCEDURE — 81025 URINE PREGNANCY TEST: CPT | Performed by: NURSE PRACTITIONER

## 2024-05-09 ENCOUNTER — HOSPITAL ENCOUNTER (EMERGENCY)
Facility: HOSPITAL | Age: 39
Discharge: HOME | End: 2024-05-09
Attending: EMERGENCY MEDICINE
Payer: MEDICAID

## 2024-05-09 VITALS
HEIGHT: 63 IN | TEMPERATURE: 98 F | SYSTOLIC BLOOD PRESSURE: 151 MMHG | BODY MASS INDEX: 31.36 KG/M2 | RESPIRATION RATE: 20 BRPM | DIASTOLIC BLOOD PRESSURE: 93 MMHG | OXYGEN SATURATION: 100 % | HEART RATE: 82 BPM | WEIGHT: 177 LBS

## 2024-05-09 DIAGNOSIS — N30.01 ACUTE CYSTITIS WITH HEMATURIA: Primary | ICD-10-CM

## 2024-05-09 PROCEDURE — 99284 EMERGENCY DEPT VISIT MOD MDM: CPT

## 2024-05-09 RX ORDER — PHENAZOPYRIDINE HYDROCHLORIDE 100 MG/1
100 TABLET, FILM COATED ORAL 3 TIMES DAILY PRN
Qty: 9 TABLET | Refills: 0 | Status: SHIPPED | OUTPATIENT
Start: 2024-05-09 | End: 2024-05-12

## 2024-05-09 RX ORDER — NITROFURANTOIN 25; 75 MG/1; MG/1
100 CAPSULE ORAL 2 TIMES DAILY
Qty: 10 CAPSULE | Refills: 0 | Status: SHIPPED | OUTPATIENT
Start: 2024-05-09 | End: 2024-05-14

## 2024-05-09 NOTE — ED PROVIDER NOTES
Encounter Date: 5/9/2024       History     Chief Complaint   Patient presents with    Urinary Frequency     Reports came to ER yesterday for UTI symptoms but left before she saw the Dr. States someone called her to come back to get abx prescription      See MDM    The history is provided by the patient. No  was used.     Review of patient's allergies indicates:  No Known Allergies  Past Medical History:   Diagnosis Date    Anemia, unspecified     Hip pain, left     Kidney lesion 2018    Lumbar radiculopathy     Pancreatitis     PCOS (polycystic ovarian syndrome)     PID (pelvic inflammatory disease)     Sciatica of left side     Sciatica without back pain, left     Swelling of hip joint, left      Past Surgical History:   Procedure Laterality Date    CHOLECYSTECTOMY  2019    LUMBAR LAMINECTOMY WITH DISCECTOMY  2020    Dr J Luis Rothman     Family History   Problem Relation Name Age of Onset    Diabetes Mellitus Maternal Grandmother      Cancer Maternal Uncle          metastatic    Cancer Maternal Uncle          metasastic     Social History     Tobacco Use    Smoking status: Every Day     Types: Vaping with nicotine    Smokeless tobacco: Never    Tobacco comments:     vapes daily   Substance Use Topics    Alcohol use: Not Currently    Drug use: Yes     Frequency: 5.0 times per week     Types: Marijuana     Comment: xanax     Review of Systems   Genitourinary:  Positive for dysuria and urgency.   All other systems reviewed and are negative.      Physical Exam     Initial Vitals [05/09/24 1442]   BP Pulse Resp Temp SpO2   (!) 151/93 82 20 98.4 °F (36.9 °C) 100 %      MAP       --         Physical Exam    Nursing note and vitals reviewed.  Constitutional: She appears well-developed and well-nourished.   Cardiovascular:  Normal rate, regular rhythm and normal heart sounds.           Pulmonary/Chest: Breath sounds normal. No respiratory distress.   Abdominal: Abdomen is soft. Bowel sounds are normal.  She exhibits no distension. There is no abdominal tenderness.   Musculoskeletal:         General: Normal range of motion.     Neurological: She is alert and oriented to person, place, and time.   Skin: Skin is warm and dry.   Psychiatric: She has a normal mood and affect.         ED Course   Procedures  Labs Reviewed - No data to display       Imaging Results    None          Medications - No data to display  Medical Decision Making  37 y/o female presents with 2-3 days of dysuria, urinary frequency and urgency. No abdominal pain. No fever. No back pain. She was here yesterday and gave a urine sample but it took too long and she had to go to work. She states she was called and told her urine was infected and needed to come back for antibiotic.     UA reviewed and certainly consistent with UTI along with her symptoms. Will give antibiotics and pyridium     Amount and/or Complexity of Data Reviewed  External Data Reviewed: labs.     Details: Reviewed urine from yesterday       Additional MDM:   Differential Diagnosis:   Other: The following diagnoses were also considered and will be evaluated: UTI, dysuria and STD.                                   Clinical Impression:  Final diagnoses:  [N30.01] Acute cystitis with hematuria (Primary)          ED Disposition Condition    Discharge Stable          ED Prescriptions       Medication Sig Dispense Start Date End Date Auth. Provider    nitrofurantoin, macrocrystal-monohydrate, (MACROBID) 100 MG capsule Take 1 capsule (100 mg total) by mouth 2 (two) times daily. for 5 days 10 capsule 5/9/2024 5/14/2024 Sandy Reddy FNP    phenazopyridine (PYRIDIUM) 100 MG tablet Take 1 tablet (100 mg total) by mouth 3 (three) times daily as needed for Pain. 9 tablet 5/9/2024 5/12/2024 Sandy Reddy FNP          Follow-up Information       Follow up With Specialties Details Why Contact Info    Dennis Bell,  Family Medicine Call in 1 week As needed [address]  [place]  [address]  [phone]               Sandy Reddy, [facility]  05/09/24 2044

## 2024-05-09 NOTE — ED NOTES
Called pt to come back in to be seen in ER. Pt states she got called in to work but will come in later.

## 2024-05-09 NOTE — DISCHARGE INSTRUCTIONS
Drink lots of water. Take macrobid as directed for full 5 days. Pyridium for burning with urination. It may turn urine red/orange so don't be alarmed.

## 2024-05-11 LAB — BACTERIA UR CULT: ABNORMAL

## 2024-05-23 ENCOUNTER — OFFICE VISIT (OUTPATIENT)
Dept: FAMILY MEDICINE | Facility: CLINIC | Age: 39
End: 2024-05-23
Payer: MEDICAID

## 2024-05-23 ENCOUNTER — LAB VISIT (OUTPATIENT)
Dept: LAB | Facility: HOSPITAL | Age: 39
End: 2024-05-23
Attending: FAMILY MEDICINE
Payer: MEDICAID

## 2024-05-23 VITALS
TEMPERATURE: 98 F | RESPIRATION RATE: 18 BRPM | WEIGHT: 173 LBS | BODY MASS INDEX: 30.65 KG/M2 | OXYGEN SATURATION: 97 % | SYSTOLIC BLOOD PRESSURE: 138 MMHG | HEIGHT: 63 IN | DIASTOLIC BLOOD PRESSURE: 89 MMHG | HEART RATE: 87 BPM

## 2024-05-23 DIAGNOSIS — Z91.199 MEDICAL NON-COMPLIANCE: ICD-10-CM

## 2024-05-23 DIAGNOSIS — N30.00 ACUTE CYSTITIS WITHOUT HEMATURIA: ICD-10-CM

## 2024-05-23 DIAGNOSIS — I10 PRIMARY HYPERTENSION: Primary | ICD-10-CM

## 2024-05-23 LAB
ALBUMIN SERPL-MCNC: 4.4 G/DL (ref 3.5–5)
ALBUMIN/GLOB SERPL: 1.1 RATIO (ref 1.1–2)
ALP SERPL-CCNC: 99 UNIT/L (ref 40–150)
ALT SERPL-CCNC: 18 UNIT/L (ref 0–55)
ANION GAP SERPL CALC-SCNC: 11 MEQ/L
AST SERPL-CCNC: 23 UNIT/L (ref 5–34)
BASOPHILS # BLD AUTO: 0.02 X10(3)/MCL
BASOPHILS NFR BLD AUTO: 0.4 %
BILIRUB SERPL-MCNC: 0.3 MG/DL
BILIRUB UR QL STRIP.AUTO: NEGATIVE
BUN SERPL-MCNC: 13 MG/DL (ref 7–18.7)
CALCIUM SERPL-MCNC: 9.7 MG/DL (ref 8.4–10.2)
CHLORIDE SERPL-SCNC: 108 MMOL/L (ref 98–107)
CLARITY UR: CLEAR
CO2 SERPL-SCNC: 21 MMOL/L (ref 22–29)
COLOR UR AUTO: YELLOW
CREAT SERPL-MCNC: 0.96 MG/DL (ref 0.55–1.02)
CREAT/UREA NIT SERPL: 14
EOSINOPHIL # BLD AUTO: 0.09 X10(3)/MCL (ref 0–0.9)
EOSINOPHIL NFR BLD AUTO: 1.7 %
ERYTHROCYTE [DISTWIDTH] IN BLOOD BY AUTOMATED COUNT: 12.7 % (ref 11.5–17)
GFR SERPLBLD CREATININE-BSD FMLA CKD-EPI: >60 ML/MIN/1.73/M2
GLOBULIN SER-MCNC: 4.1 GM/DL (ref 2.4–3.5)
GLUCOSE SERPL-MCNC: 113 MG/DL (ref 74–100)
GLUCOSE UR QL STRIP: NEGATIVE
HCT VFR BLD AUTO: 44.9 % (ref 37–47)
HGB BLD-MCNC: 14.4 G/DL (ref 12–16)
HGB UR QL STRIP: NEGATIVE
IMM GRANULOCYTES # BLD AUTO: 0.01 X10(3)/MCL (ref 0–0.04)
IMM GRANULOCYTES NFR BLD AUTO: 0.2 %
KETONES UR QL STRIP: NEGATIVE
LEUKOCYTE ESTERASE UR QL STRIP: NEGATIVE
LYMPHOCYTES # BLD AUTO: 1.36 X10(3)/MCL (ref 0.6–4.6)
LYMPHOCYTES NFR BLD AUTO: 25.7 %
MCH RBC QN AUTO: 28.4 PG (ref 27–31)
MCHC RBC AUTO-ENTMCNC: 32.1 G/DL (ref 33–36)
MCV RBC AUTO: 88.6 FL (ref 80–94)
MONOCYTES # BLD AUTO: 0.26 X10(3)/MCL (ref 0.1–1.3)
MONOCYTES NFR BLD AUTO: 4.9 %
NEUTROPHILS # BLD AUTO: 3.56 X10(3)/MCL (ref 2.1–9.2)
NEUTROPHILS NFR BLD AUTO: 67.1 %
NITRITE UR QL STRIP: NEGATIVE
NRBC BLD AUTO-RTO: 0 %
PH UR STRIP: 6 [PH]
PLATELET # BLD AUTO: 363 X10(3)/MCL (ref 130–400)
PMV BLD AUTO: 9.1 FL (ref 7.4–10.4)
POTASSIUM SERPL-SCNC: 4.2 MMOL/L (ref 3.5–5.1)
PROT SERPL-MCNC: 8.5 GM/DL (ref 6.4–8.3)
PROT UR QL STRIP: NEGATIVE
RBC # BLD AUTO: 5.07 X10(6)/MCL (ref 4.2–5.4)
SODIUM SERPL-SCNC: 140 MMOL/L (ref 136–145)
SP GR UR STRIP.AUTO: >=1.03 (ref 1–1.03)
UROBILINOGEN UR STRIP-ACNC: 0.2
WBC # SPEC AUTO: 5.3 X10(3)/MCL (ref 4.5–11.5)

## 2024-05-23 PROCEDURE — 3079F DIAST BP 80-89 MM HG: CPT | Mod: CPTII,,, | Performed by: FAMILY MEDICINE

## 2024-05-23 PROCEDURE — 1159F MED LIST DOCD IN RCRD: CPT | Mod: CPTII,,, | Performed by: FAMILY MEDICINE

## 2024-05-23 PROCEDURE — 85025 COMPLETE CBC W/AUTO DIFF WBC: CPT

## 2024-05-23 PROCEDURE — 80053 COMPREHEN METABOLIC PANEL: CPT

## 2024-05-23 PROCEDURE — 3008F BODY MASS INDEX DOCD: CPT | Mod: CPTII,,, | Performed by: FAMILY MEDICINE

## 2024-05-23 PROCEDURE — 1160F RVW MEDS BY RX/DR IN RCRD: CPT | Mod: CPTII,,, | Performed by: FAMILY MEDICINE

## 2024-05-23 PROCEDURE — 36415 COLL VENOUS BLD VENIPUNCTURE: CPT

## 2024-05-23 PROCEDURE — 81003 URINALYSIS AUTO W/O SCOPE: CPT

## 2024-05-23 PROCEDURE — 99214 OFFICE O/P EST MOD 30 MIN: CPT | Mod: ,,, | Performed by: FAMILY MEDICINE

## 2024-05-23 PROCEDURE — 3075F SYST BP GE 130 - 139MM HG: CPT | Mod: CPTII,,, | Performed by: FAMILY MEDICINE

## 2024-05-23 RX ORDER — AMLODIPINE BESYLATE 5 MG/1
5 TABLET ORAL DAILY
Qty: 30 TABLET | Refills: 5 | Status: SHIPPED | OUTPATIENT
Start: 2024-05-23 | End: 2024-11-19

## 2024-05-23 NOTE — PROGRESS NOTES
Patient ID: [id number]     Chief Complaint: Follow-up, Blood Pressure Check, and Dysuria (Went to [facility] 5/9/ - Acute cystitis - still having burning with urination, wants to recheck for UTI)    HPI:     Carmen Laurent is a 38 y.o. female here today for Follow-up, Blood Pressure Check, and Dysuria (Went to [facility] 5/9/ - Acute cystitis - still having burning with urination, wants to recheck for UTI)Admits to skipping several days during treatment course before finishing the antibiotics.     -------------------------------------    Anemia, unspecified    Hip pain, left    Kidney lesion    Lumbar radiculopathy    Pancreatitis    PCOS (polycystic ovarian syndrome)    PID (pelvic inflammatory disease)    Sciatica of left side    Sciatica without back pain, left    Swelling of hip joint, left        Past Surgical History:   Procedure Laterality Date    CHOLECYSTECTOMY  2019    LUMBAR LAMINECTOMY WITH DISCECTOMY  2020    Dr J Luis Rothman       Review of patient's allergies indicates:  No Known Allergies    Outpatient Medications Marked as Taking for the 5/23/24 encounter (Office Visit) with Dennis Bell, DO   Medication Sig Dispense Refill    gabapentin (NEURONTIN) 800 MG tablet Take 800 mg by mouth 3 (three) times daily.      [DISCONTINUED] amLODIPine (NORVASC) 5 MG tablet Take 1 tablet (5 mg total) by mouth once daily. 30 tablet 5       Social History     Socioeconomic History    Marital status: Single    Number of children: 0   Tobacco Use    Smoking status: Some Days     Current packs/day: 0.25     Average packs/day: 0.3 packs/day for 3.0 years (0.8 ttl pk-yrs)     Types: Vaping with nicotine, Cigarettes    Smokeless tobacco: Never    Tobacco comments:     vapes daily   Substance and Sexual Activity    Alcohol use: Not Currently    Drug use: Not Currently     Frequency: 5.0 times per week     Types: Marijuana     Comment: xanax    Sexual activity: Yes     Social Determinants of Health     Financial Resource  "Strain: Low Risk  (12/14/2023)    Overall Financial Resource Strain (CARDIA)     Difficulty of Paying Living Expenses: Not hard at all   Food Insecurity: Food Insecurity Present (12/14/2023)    Hunger Vital Sign     Worried About Running Out of Food in the Last Year: Never true     Ran Out of Food in the Last Year: Sometimes true   Transportation Needs: No Transportation Needs (12/14/2023)    PRAPARE - Transportation     Lack of Transportation (Medical): No     Lack of Transportation (Non-Medical): No   Physical Activity: Unknown (12/14/2023)    Exercise Vital Sign     Days of Exercise per Week: Patient declined     Minutes of Exercise per Session: 30 min   Stress: No Stress Concern Present (6/27/2023)    [employer] [place] [employer] - Occupational Stress Questionnaire     Feeling of Stress : Only a little   Housing Stability: Low Risk  (12/14/2023)    Housing Stability Vital Sign     Unable to Pay for Housing in the Last Year: No     Number of Places Lived in the Last Year: 1     Unstable Housing in the Last Year: No        Family History   Problem Relation Name Age of Onset    Diabetes Mellitus Maternal Grandmother      Cancer Maternal Uncle          metastatic    Cancer Maternal Uncle          metasastic        Patient Care Team:  Dennis Bell DO as PCP - General (Family Medicine)     Subjective:     Review of Systems   Constitutional:  Negative for chills and fever.   Respiratory:  Negative for shortness of breath.    Cardiovascular:  Negative for chest pain.   Gastrointestinal:  Negative for constipation and diarrhea.   Genitourinary:  Positive for dysuria.   Neurological:  Positive for headaches. Negative for dizziness.   Psychiatric/Behavioral:  The patient does not have insomnia.      See HPI for details  All Other ROS: Negative except as stated in HPI.     Objective:     /89   Pulse 87   Temp 97.9 °F (36.6 °C) (Temporal)   Resp 18   Ht 5' 2.99" (1.6 m)   Wt 78.5 kg (173 lb)   " SpO2 97%   BMI 30.65 kg/m²     Physical Exam  Vitals reviewed.   Constitutional:       General: She is not in acute distress.     Appearance: Normal appearance.   Cardiovascular:      Rate and Rhythm: Normal rate and regular rhythm.      Heart sounds: No murmur heard.     No friction rub. No gallop.   Pulmonary:      Effort: No respiratory distress.      Breath sounds: No wheezing, rhonchi or rales.   Musculoskeletal:         General: No swelling, tenderness or deformity.      Right lower leg: No edema.      Left lower leg: No edema.   Skin:     General: Skin is warm and dry.      Findings: No lesion or rash.   Neurological:      General: No focal deficit present.      Mental Status: She is alert.   Psychiatric:         Mood and Affect: Mood normal.         Assessment/Plan:     1. Primary hypertension  -     amLODIPine (NORVASC) 5 MG tablet; Take 1 tablet (5 mg total) by mouth once daily.  Dispense: 30 tablet; Refill: 5  -Educated patient on the importance of taking her blood pressure medication daily as prescribed.   2. Acute cystitis without hematuria  -     Urinalysis, Reflex to Urine Culture; Future; Expected date: 05/23/2024  -     CBC Auto Differential; Future; Expected date: 05/23/2024  -     Comprehensive Metabolic Panel; Future; Expected date: 05/23/2024  -If UA is negative, will try prescribing diflucan. IF positive, will go off of most recent culture from 5/9/24 but assuming Macrobid may now be ineffective due to patient skipping several dose.   3. Medical non-compliance    Follow up:     Follow up in about 6 months (around 11/23/2024) for Wellness. In addition to their scheduled follow up, the patient has also been instructed to follow up on as needed basis.

## 2024-06-02 RX ORDER — FLUCONAZOLE 150 MG/1
150 TABLET ORAL DAILY
Qty: 1 TABLET | Refills: 0 | Status: SHIPPED | OUTPATIENT
Start: 2024-06-02 | End: 2024-06-03

## 2024-06-03 ENCOUNTER — TELEPHONE (OUTPATIENT)
Dept: FAMILY MEDICINE | Facility: CLINIC | Age: 39
End: 2024-06-03
Payer: MEDICAID

## 2024-06-03 NOTE — TELEPHONE ENCOUNTER
----- Message from Omar Kruse NP sent at 5/31/2024 10:02 AM CDT -----  Please inform patient of lab results.     1. UA negative.     2. All other labs within normal ranges. Is the patient still experiencing dysuria?     Thanks for all you do,   Omar

## 2024-10-30 DIAGNOSIS — Z12.31 BREAST CANCER SCREENING BY MAMMOGRAM: ICD-10-CM

## 2024-10-30 DIAGNOSIS — I10 PRIMARY HYPERTENSION: ICD-10-CM

## 2024-10-30 DIAGNOSIS — Z00.00 WELLNESS EXAMINATION: Primary | ICD-10-CM

## 2025-01-31 ENCOUNTER — TELEPHONE (OUTPATIENT)
Dept: FAMILY MEDICINE | Facility: CLINIC | Age: 40
End: 2025-01-31
Payer: MEDICAID

## 2025-02-06 ENCOUNTER — TELEPHONE (OUTPATIENT)
Dept: FAMILY MEDICINE | Facility: CLINIC | Age: 40
End: 2025-02-06
Payer: MEDICAID

## 2025-02-06 NOTE — TELEPHONE ENCOUNTER
Attempted to contact patient to schedule follow up due to no appt on file and to schedule mammogram no answer left voicemail to return call.